# Patient Record
Sex: MALE | Race: WHITE | NOT HISPANIC OR LATINO | Employment: FULL TIME | URBAN - METROPOLITAN AREA
[De-identification: names, ages, dates, MRNs, and addresses within clinical notes are randomized per-mention and may not be internally consistent; named-entity substitution may affect disease eponyms.]

---

## 2017-02-20 ENCOUNTER — ALLSCRIPTS OFFICE VISIT (OUTPATIENT)
Dept: OTHER | Facility: OTHER | Age: 57
End: 2017-02-20

## 2017-02-20 DIAGNOSIS — Q61.9 CYSTIC KIDNEY DISEASE: ICD-10-CM

## 2017-02-21 ENCOUNTER — GENERIC CONVERSION - ENCOUNTER (OUTPATIENT)
Dept: OTHER | Facility: OTHER | Age: 57
End: 2017-02-21

## 2017-02-27 ENCOUNTER — HOSPITAL ENCOUNTER (OUTPATIENT)
Dept: ULTRASOUND IMAGING | Facility: MEDICAL CENTER | Age: 57
Discharge: HOME | End: 2017-02-27
Payer: COMMERCIAL

## 2017-02-27 DIAGNOSIS — Q61.9 CYSTIC KIDNEY DISEASE: ICD-10-CM

## 2017-02-27 PROCEDURE — 76770 US EXAM ABDO BACK WALL COMP: CPT

## 2017-02-28 ENCOUNTER — GENERIC CONVERSION - ENCOUNTER (OUTPATIENT)
Dept: OTHER | Facility: OTHER | Age: 57
End: 2017-02-28

## 2017-03-02 ENCOUNTER — GENERIC CONVERSION - ENCOUNTER (OUTPATIENT)
Dept: OTHER | Facility: OTHER | Age: 57
End: 2017-03-02

## 2017-03-02 ENCOUNTER — LAB CONVERSION - ENCOUNTER (OUTPATIENT)
Dept: OTHER | Facility: OTHER | Age: 57
End: 2017-03-02

## 2017-03-02 LAB
A/G RATIO (HISTORICAL): 1.6 (CALC) (ref 1–2.5)
ALBUMIN SERPL BCP-MCNC: 3.9 G/DL (ref 3.6–5.1)
ALP SERPL-CCNC: 79 U/L (ref 40–115)
ALT SERPL W P-5'-P-CCNC: 15 U/L (ref 9–46)
AST SERPL W P-5'-P-CCNC: 15 U/L (ref 10–35)
BASOPHILS # BLD AUTO: 0.5 %
BASOPHILS # BLD AUTO: 32 CELLS/UL (ref 0–200)
BILIRUB SERPL-MCNC: 0.4 MG/DL (ref 0.2–1.2)
BUN SERPL-MCNC: 18 MG/DL (ref 7–25)
BUN/CREA RATIO (HISTORICAL): ABNORMAL (CALC) (ref 6–22)
CALCIUM (ADJUSTED FOR ALBUMIN) (HISTORICAL): 9.5 MG/DL (CALC) (ref 8.6–10.2)
CALCIUM SERPL-MCNC: 9.1 MG/DL (ref 8.6–10.3)
CHLORIDE SERPL-SCNC: 106 MMOL/L (ref 98–110)
CHOLEST SERPL-MCNC: 149 MG/DL (ref 125–200)
CHOLEST/HDLC SERPL: 2.8 (CALC)
CO2 SERPL-SCNC: 26 MMOL/L (ref 20–31)
CREAT SERPL-MCNC: 1.07 MG/DL (ref 0.7–1.33)
DEPRECATED RDW RBC AUTO: 13.3 % (ref 11–15)
EGFR AFRICAN AMERICAN (HISTORICAL): 90 ML/MIN/1.73M2
EGFR-AMERICAN CALC (HISTORICAL): 78 ML/MIN/1.73M2
EOSINOPHIL # BLD AUTO: 1.3 %
EOSINOPHIL # BLD AUTO: 82 CELLS/UL (ref 15–500)
EST. AVERAGE GLUCOSE BLD GHB EST-MCNC: 123 (CALC)
EST. AVERAGE GLUCOSE BLD GHB EST-MCNC: 6.8 (CALC)
GAMMA GLOBULIN (HISTORICAL): 2.4 G/DL (CALC) (ref 1.9–3.7)
GLUCOSE (HISTORICAL): 100 MG/DL (ref 65–99)
HBA1C MFR BLD HPLC: 5.9 % OF TOTAL HGB
HCT VFR BLD AUTO: 42.2 % (ref 38.5–50)
HDLC SERPL-MCNC: 54 MG/DL
HGB BLD-MCNC: 14.2 G/DL (ref 13.2–17.1)
HIV AG/AB, 4TH GEN (HISTORICAL): NORMAL
LDL CHOLESTEROL (HISTORICAL): 87 MG/DL (CALC)
LYMPHOCYTES # BLD AUTO: 2249 CELLS/UL (ref 850–3900)
LYMPHOCYTES # BLD AUTO: 35.7 %
MCH RBC QN AUTO: 29.3 PG (ref 27–33)
MCHC RBC AUTO-ENTMCNC: 33.6 G/DL (ref 32–36)
MCV RBC AUTO: 87.1 FL (ref 80–100)
MONOCYTES # BLD AUTO: 372 CELLS/UL (ref 200–950)
MONOCYTES (HISTORICAL): 5.9 %
NEUTROPHILS # BLD AUTO: 3566 CELLS/UL (ref 1500–7800)
NEUTROPHILS # BLD AUTO: 56.6 %
NON-HDL-CHOL (CHOL-HDL) (HISTORICAL): 95 MG/DL (CALC)
PLATELET # BLD AUTO: 263 THOUSAND/UL (ref 140–400)
PMV BLD AUTO: 8.5 FL (ref 7.5–12.5)
POTASSIUM SERPL-SCNC: 4.6 MMOL/L (ref 3.5–5.3)
RBC # BLD AUTO: 4.84 MILLION/UL (ref 4.2–5.8)
SODIUM SERPL-SCNC: 139 MMOL/L (ref 135–146)
TOTAL PROTEIN (HISTORICAL): 6.3 G/DL (ref 6.1–8.1)
TRIGL SERPL-MCNC: 38 MG/DL
TSH SERPL DL<=0.05 MIU/L-ACNC: 2.22 MIU/L (ref 0.4–4.5)
WBC # BLD AUTO: 6.3 THOUSAND/UL (ref 3.8–10.8)

## 2017-03-03 ENCOUNTER — ALLSCRIPTS OFFICE VISIT (OUTPATIENT)
Dept: OTHER | Facility: OTHER | Age: 57
End: 2017-03-03

## 2017-03-10 ENCOUNTER — ALLSCRIPTS OFFICE VISIT (OUTPATIENT)
Dept: OTHER | Facility: OTHER | Age: 57
End: 2017-03-10

## 2017-03-17 ENCOUNTER — GENERIC CONVERSION - ENCOUNTER (OUTPATIENT)
Dept: OTHER | Facility: OTHER | Age: 57
End: 2017-03-17

## 2017-05-01 ENCOUNTER — ALLSCRIPTS OFFICE VISIT (OUTPATIENT)
Dept: OTHER | Facility: OTHER | Age: 57
End: 2017-05-01

## 2017-05-01 DIAGNOSIS — Q61.9 CYSTIC KIDNEY DISEASE: ICD-10-CM

## 2017-05-01 DIAGNOSIS — I10 ESSENTIAL (PRIMARY) HYPERTENSION: ICD-10-CM

## 2017-05-12 ENCOUNTER — GENERIC CONVERSION - ENCOUNTER (OUTPATIENT)
Dept: OTHER | Facility: OTHER | Age: 57
End: 2017-05-12

## 2017-05-20 ENCOUNTER — OFFICE VISIT (OUTPATIENT)
Dept: URGENT CARE | Facility: MEDICAL CENTER | Age: 57
End: 2017-05-20
Payer: COMMERCIAL

## 2017-05-20 PROCEDURE — G0382 LEV 3 HOSP TYPE B ED VISIT: HCPCS

## 2017-05-20 PROCEDURE — S9083 URGENT CARE CENTER GLOBAL: HCPCS

## 2017-05-20 PROCEDURE — 90715 TDAP VACCINE 7 YRS/> IM: CPT

## 2017-05-20 PROCEDURE — 12002 RPR S/N/AX/GEN/TRNK2.6-7.5CM: CPT

## 2017-05-22 ENCOUNTER — OFFICE VISIT (OUTPATIENT)
Dept: URGENT CARE | Facility: MEDICAL CENTER | Age: 57
End: 2017-05-22
Payer: COMMERCIAL

## 2017-05-22 PROCEDURE — 99213 OFFICE O/P EST LOW 20 MIN: CPT

## 2017-05-23 ENCOUNTER — LAB CONVERSION - ENCOUNTER (OUTPATIENT)
Dept: OTHER | Facility: OTHER | Age: 57
End: 2017-05-23

## 2017-05-23 LAB
BACTERIA UR QL AUTO: ABNORMAL /HPF
BILIRUB UR QL STRIP: NEGATIVE
BUN SERPL-MCNC: 19 MG/DL (ref 7–25)
BUN/CREA RATIO (HISTORICAL): NORMAL (CALC) (ref 6–22)
CALCIUM SERPL-MCNC: 9.2 MG/DL (ref 8.6–10.3)
CHLORIDE SERPL-SCNC: 106 MMOL/L (ref 98–110)
CO2 SERPL-SCNC: 27 MMOL/L (ref 20–31)
COLOR UR: YELLOW
COMMENT (HISTORICAL): CLEAR
COMMENTS (HISTORICAL): ABNORMAL
CREAT SERPL-MCNC: 1.01 MG/DL (ref 0.7–1.33)
CREATININE, RANDOM URINE (HISTORICAL): 69 MG/DL (ref 20–370)
EGFR AFRICAN AMERICAN (HISTORICAL): 97 ML/MIN/1.73M2
EGFR-AMERICAN CALC (HISTORICAL): 83 ML/MIN/1.73M2
FECAL OCCULT BLOOD DIAGNOSTIC (HISTORICAL): ABNORMAL
GLUCOSE (HISTORICAL): 98 MG/DL (ref 65–99)
GLUCOSE (HISTORICAL): NEGATIVE
HYALINE CASTS #/AREA URNS LPF: ABNORMAL /LPF
KETONES UR STRIP-MCNC: NEGATIVE MG/DL
LEUKOCYTE ESTERASE UR QL STRIP: NEGATIVE
NITRITE UR QL STRIP: NEGATIVE
PH UR STRIP.AUTO: 5.5 [PH] (ref 5–8)
POTASSIUM SERPL-SCNC: 4.8 MMOL/L (ref 3.5–5.3)
PROT UR STRIP-MCNC: NEGATIVE MG/DL
PROT UR-MCNC: 38 MG/DL (ref 5–25)
PROT/CREAT UR: 551 MG/G CREAT (ref 22–128)
RBC (HISTORICAL): ABNORMAL /HPF
SODIUM SERPL-SCNC: 139 MMOL/L (ref 135–146)
SP GR UR STRIP.AUTO: 1.01 (ref 1–1.03)
SQUAMOUS EPITHELIAL CELLS (HISTORICAL): ABNORMAL /HPF
WBC # BLD AUTO: ABNORMAL /HPF

## 2017-06-10 ENCOUNTER — OFFICE VISIT (OUTPATIENT)
Dept: URGENT CARE | Facility: MEDICAL CENTER | Age: 57
End: 2017-06-10
Payer: COMMERCIAL

## 2017-06-10 PROCEDURE — G0383 LEV 4 HOSP TYPE B ED VISIT: HCPCS

## 2017-06-10 PROCEDURE — S9083 URGENT CARE CENTER GLOBAL: HCPCS

## 2017-07-03 DIAGNOSIS — I10 ESSENTIAL (PRIMARY) HYPERTENSION: ICD-10-CM

## 2017-07-03 DIAGNOSIS — Q61.2 ADULT TYPE POLYCYSTIC KIDNEY: ICD-10-CM

## 2017-08-15 ENCOUNTER — ALLSCRIPTS OFFICE VISIT (OUTPATIENT)
Dept: OTHER | Facility: OTHER | Age: 57
End: 2017-08-15

## 2017-08-15 DIAGNOSIS — N45.1 EPIDIDYMITIS: ICD-10-CM

## 2017-08-15 DIAGNOSIS — S20.212A CONTUSION OF LEFT FRONT WALL OF THORAX: ICD-10-CM

## 2017-08-15 DIAGNOSIS — E87.5 HYPERKALEMIA: ICD-10-CM

## 2017-08-15 DIAGNOSIS — N50.89 OTHER SPECIFIED DISORDERS OF THE MALE GENITAL ORGANS: ICD-10-CM

## 2017-08-15 LAB
BILIRUB UR QL STRIP: NEGATIVE
CLARITY UR: NORMAL
COLOR UR: YELLOW
GLUCOSE (HISTORICAL): NEGATIVE
HGB UR QL STRIP.AUTO: NORMAL
KETONES UR STRIP-MCNC: NEGATIVE MG/DL
LEUKOCYTE ESTERASE UR QL STRIP: NORMAL
NITRITE UR QL STRIP: NEGATIVE
PH UR STRIP.AUTO: 5.5 [PH]
PROT UR STRIP-MCNC: NORMAL MG/DL
SP GR UR STRIP.AUTO: 1.02
UROBILINOGEN UR QL STRIP.AUTO: 0.2

## 2017-08-16 ENCOUNTER — APPOINTMENT (OUTPATIENT)
Dept: LAB | Facility: HOSPITAL | Age: 57
End: 2017-08-16
Payer: COMMERCIAL

## 2017-08-16 DIAGNOSIS — N45.1 EPIDIDYMITIS: ICD-10-CM

## 2017-08-16 PROCEDURE — 87086 URINE CULTURE/COLONY COUNT: CPT

## 2017-08-17 ENCOUNTER — GENERIC CONVERSION - ENCOUNTER (OUTPATIENT)
Dept: OTHER | Facility: OTHER | Age: 57
End: 2017-08-17

## 2017-08-17 LAB — BACTERIA UR CULT: NORMAL

## 2017-08-26 ENCOUNTER — APPOINTMENT (OUTPATIENT)
Dept: RADIOLOGY | Facility: MEDICAL CENTER | Age: 57
End: 2017-08-26
Payer: COMMERCIAL

## 2017-08-26 ENCOUNTER — OFFICE VISIT (OUTPATIENT)
Dept: URGENT CARE | Facility: MEDICAL CENTER | Age: 57
End: 2017-08-26
Payer: COMMERCIAL

## 2017-08-26 DIAGNOSIS — S20.212A CONTUSION OF LEFT FRONT WALL OF THORAX: ICD-10-CM

## 2017-08-26 PROCEDURE — S9083 URGENT CARE CENTER GLOBAL: HCPCS

## 2017-08-26 PROCEDURE — 71101 X-RAY EXAM UNILAT RIBS/CHEST: CPT

## 2017-08-26 PROCEDURE — G0382 LEV 3 HOSP TYPE B ED VISIT: HCPCS

## 2017-09-11 ENCOUNTER — LAB REQUISITION (OUTPATIENT)
Dept: LAB | Facility: HOSPITAL | Age: 57
End: 2017-09-11
Payer: COMMERCIAL

## 2017-09-11 ENCOUNTER — ALLSCRIPTS OFFICE VISIT (OUTPATIENT)
Dept: OTHER | Facility: OTHER | Age: 57
End: 2017-09-11

## 2017-09-11 DIAGNOSIS — Z13.1 ENCOUNTER FOR SCREENING FOR DIABETES MELLITUS: ICD-10-CM

## 2017-09-11 DIAGNOSIS — Z13.0 ENCOUNTER FOR SCREENING FOR DISEASES OF THE BLOOD AND BLOOD-FORMING ORGANS AND CERTAIN DISORDERS INVOLVING THE IMMUNE MECHANISM: ICD-10-CM

## 2017-09-11 DIAGNOSIS — Z72.51 HIGH RISK HETEROSEXUAL BEHAVIOR: ICD-10-CM

## 2017-09-11 DIAGNOSIS — N45.1 EPIDIDYMITIS: ICD-10-CM

## 2017-09-11 DIAGNOSIS — F41.1 GENERALIZED ANXIETY DISORDER: ICD-10-CM

## 2017-09-11 DIAGNOSIS — I10 ESSENTIAL (PRIMARY) HYPERTENSION: ICD-10-CM

## 2017-09-11 DIAGNOSIS — Q61.3 POLYCYSTIC KIDNEY: ICD-10-CM

## 2017-09-11 DIAGNOSIS — Q61.2 ADULT TYPE POLYCYSTIC KIDNEY: ICD-10-CM

## 2017-09-11 LAB
ALBUMIN SERPL BCP-MCNC: 4.1 G/DL (ref 3.5–5)
ALP SERPL-CCNC: 110 U/L (ref 46–116)
ALT SERPL W P-5'-P-CCNC: 21 U/L (ref 12–78)
ANION GAP SERPL CALCULATED.3IONS-SCNC: 5 MMOL/L (ref 4–13)
AST SERPL W P-5'-P-CCNC: 17 U/L (ref 5–45)
BILIRUB SERPL-MCNC: 0.5 MG/DL (ref 0.2–1)
BILIRUB UR QL STRIP: NEGATIVE
BUN SERPL-MCNC: 21 MG/DL (ref 5–25)
CALCIUM SERPL-MCNC: 9.6 MG/DL (ref 8.3–10.1)
CHLORIDE SERPL-SCNC: 101 MMOL/L (ref 100–108)
CHOLEST SERPL-MCNC: 166 MG/DL (ref 50–200)
CLARITY UR: NORMAL
CO2 SERPL-SCNC: 28 MMOL/L (ref 21–32)
COLOR UR: YELLOW
CREAT SERPL-MCNC: 0.93 MG/DL (ref 0.6–1.3)
EST. AVERAGE GLUCOSE BLD GHB EST-MCNC: 108 MG/DL
GFR SERPL CREATININE-BSD FRML MDRD: 92 ML/MIN/1.73SQ M
GLUCOSE (HISTORICAL): NEGATIVE
GLUCOSE P FAST SERPL-MCNC: 102 MG/DL (ref 65–99)
HBA1C MFR BLD: 5.4 % (ref 4.2–6.3)
HDLC SERPL-MCNC: 70 MG/DL (ref 40–60)
HGB UR QL STRIP.AUTO: NORMAL
KETONES UR STRIP-MCNC: NEGATIVE MG/DL
LDLC SERPL CALC-MCNC: 85 MG/DL (ref 0–100)
LEUKOCYTE ESTERASE UR QL STRIP: NEGATIVE
NITRITE UR QL STRIP: NEGATIVE
PH UR STRIP.AUTO: 7.5 [PH]
POTASSIUM SERPL-SCNC: 5.6 MMOL/L (ref 3.5–5.3)
PROT SERPL-MCNC: 7.3 G/DL (ref 6.4–8.2)
PROT UR STRIP-MCNC: NEGATIVE MG/DL
SODIUM SERPL-SCNC: 134 MMOL/L (ref 136–145)
SP GR UR STRIP.AUTO: 10.2
TRIGL SERPL-MCNC: 55 MG/DL
UROBILINOGEN UR QL STRIP.AUTO: 0.2

## 2017-09-11 PROCEDURE — 80053 COMPREHEN METABOLIC PANEL: CPT | Performed by: FAMILY MEDICINE

## 2017-09-11 PROCEDURE — 83036 HEMOGLOBIN GLYCOSYLATED A1C: CPT | Performed by: FAMILY MEDICINE

## 2017-09-11 PROCEDURE — 87389 HIV-1 AG W/HIV-1&-2 AB AG IA: CPT | Performed by: FAMILY MEDICINE

## 2017-09-11 PROCEDURE — 80061 LIPID PANEL: CPT | Performed by: FAMILY MEDICINE

## 2017-09-13 LAB — HIV 1+2 AB+HIV1 P24 AG SERPL QL IA: NORMAL

## 2017-09-14 ENCOUNTER — GENERIC CONVERSION - ENCOUNTER (OUTPATIENT)
Dept: OTHER | Facility: OTHER | Age: 57
End: 2017-09-14

## 2017-09-18 ENCOUNTER — HOSPITAL ENCOUNTER (OUTPATIENT)
Dept: ULTRASOUND IMAGING | Facility: MEDICAL CENTER | Age: 57
Discharge: HOME | End: 2017-09-18
Payer: COMMERCIAL

## 2017-09-18 DIAGNOSIS — N45.1 EPIDIDYMITIS: ICD-10-CM

## 2017-09-18 DIAGNOSIS — N50.89 OTHER SPECIFIED DISORDERS OF THE MALE GENITAL ORGANS: ICD-10-CM

## 2017-09-18 PROCEDURE — 76870 US EXAM SCROTUM: CPT

## 2017-09-19 ENCOUNTER — APPOINTMENT (OUTPATIENT)
Dept: LAB | Facility: HOSPITAL | Age: 57
End: 2017-09-19
Payer: COMMERCIAL

## 2017-09-19 DIAGNOSIS — E87.5 HYPERKALEMIA: ICD-10-CM

## 2017-09-19 LAB
ANION GAP SERPL CALCULATED.3IONS-SCNC: 5 MMOL/L (ref 4–13)
BUN SERPL-MCNC: 20 MG/DL (ref 5–25)
CALCIUM SERPL-MCNC: 9.2 MG/DL (ref 8.3–10.1)
CHLORIDE SERPL-SCNC: 108 MMOL/L (ref 100–108)
CO2 SERPL-SCNC: 28 MMOL/L (ref 21–32)
CREAT SERPL-MCNC: 0.94 MG/DL (ref 0.6–1.3)
GFR SERPL CREATININE-BSD FRML MDRD: 91 ML/MIN/1.73SQ M
GLUCOSE SERPL-MCNC: 87 MG/DL (ref 65–140)
POTASSIUM SERPL-SCNC: 5.8 MMOL/L (ref 3.5–5.3)
SODIUM SERPL-SCNC: 141 MMOL/L (ref 136–145)

## 2017-09-19 PROCEDURE — 80048 BASIC METABOLIC PNL TOTAL CA: CPT

## 2017-09-19 PROCEDURE — 36415 COLL VENOUS BLD VENIPUNCTURE: CPT

## 2017-10-16 ENCOUNTER — HOSPITAL ENCOUNTER (OUTPATIENT)
Dept: ULTRASOUND IMAGING | Facility: MEDICAL CENTER | Age: 57
Discharge: HOME | End: 2017-10-16
Payer: COMMERCIAL

## 2017-10-16 DIAGNOSIS — Q61.9 CYSTIC KIDNEY DISEASE: ICD-10-CM

## 2017-10-16 PROCEDURE — 76700 US EXAM ABDOM COMPLETE: CPT

## 2017-10-23 ENCOUNTER — GENERIC CONVERSION - ENCOUNTER (OUTPATIENT)
Dept: OTHER | Facility: OTHER | Age: 57
End: 2017-10-23

## 2017-10-23 ENCOUNTER — GENERIC CONVERSION - ENCOUNTER (OUTPATIENT)
Dept: NEPHROLOGY | Facility: CLINIC | Age: 57
End: 2017-10-23

## 2017-10-23 DIAGNOSIS — Q61.2 ADULT TYPE POLYCYSTIC KIDNEY: ICD-10-CM

## 2017-10-23 DIAGNOSIS — Z12.5 ENCOUNTER FOR SCREENING FOR MALIGNANT NEOPLASM OF PROSTATE: ICD-10-CM

## 2017-10-23 DIAGNOSIS — E87.5 HYPERKALEMIA: ICD-10-CM

## 2017-10-25 ENCOUNTER — TRANSCRIBE ORDERS (OUTPATIENT)
Dept: ADMINISTRATIVE | Facility: HOSPITAL | Age: 57
End: 2017-10-25

## 2017-10-25 ENCOUNTER — APPOINTMENT (OUTPATIENT)
Dept: LAB | Facility: MEDICAL CENTER | Age: 57
End: 2017-10-25
Payer: COMMERCIAL

## 2017-10-25 DIAGNOSIS — E87.5 HYPERKALEMIA: ICD-10-CM

## 2017-10-25 LAB
ANION GAP SERPL CALCULATED.3IONS-SCNC: 7 MMOL/L (ref 4–13)
BACTERIA UR QL AUTO: ABNORMAL /HPF
BILIRUB UR QL STRIP: NEGATIVE
BUN SERPL-MCNC: 16 MG/DL (ref 5–25)
CALCIUM SERPL-MCNC: 9 MG/DL (ref 8.3–10.1)
CHLORIDE SERPL-SCNC: 105 MMOL/L (ref 100–108)
CHLORIDE UR-SCNC: 146 MMOL/L (ref 10–330)
CLARITY UR: CLEAR
CO2 SERPL-SCNC: 28 MMOL/L (ref 21–32)
COLOR UR: YELLOW
CREAT SERPL-MCNC: 0.97 MG/DL (ref 0.6–1.3)
CREAT UR-MCNC: 76.4 MG/DL
CREAT UR-MCNC: 76.4 MG/DL
GFR SERPL CREATININE-BSD FRML MDRD: 87 ML/MIN/1.73SQ M
GLUCOSE P FAST SERPL-MCNC: 83 MG/DL (ref 65–99)
GLUCOSE UR STRIP-MCNC: NEGATIVE MG/DL
HGB UR QL STRIP.AUTO: ABNORMAL
HYALINE CASTS #/AREA URNS LPF: ABNORMAL /LPF
KETONES UR STRIP-MCNC: NEGATIVE MG/DL
LEUKOCYTE ESTERASE UR QL STRIP: NEGATIVE
NITRITE UR QL STRIP: NEGATIVE
NON-SQ EPI CELLS URNS QL MICRO: ABNORMAL /HPF
OSMOLALITY UR/SERPL-RTO: 294 MMOL/KG (ref 282–298)
OSMOLALITY UR: 480 MMOL/KG
PH UR STRIP.AUTO: 6.5 [PH] (ref 4.5–8)
POTASSIUM SERPL-SCNC: 4.8 MMOL/L (ref 3.5–5.3)
POTASSIUM UR-SCNC: 50.2 MMOL/L (ref 1–300)
PROT UR STRIP-MCNC: NEGATIVE MG/DL
PROT UR-MCNC: 6 MG/DL
PROT/CREAT UR: 0.08 MG/G{CREAT} (ref 0–0.1)
RBC #/AREA URNS AUTO: ABNORMAL /HPF
SODIUM 24H UR-SCNC: 108 MOL/L
SODIUM SERPL-SCNC: 140 MMOL/L (ref 136–145)
SP GR UR STRIP.AUTO: 1.01 (ref 1–1.03)
UROBILINOGEN UR QL STRIP.AUTO: 0.2 E.U./DL
WBC #/AREA URNS AUTO: ABNORMAL /HPF

## 2017-10-25 PROCEDURE — 83930 ASSAY OF BLOOD OSMOLALITY: CPT

## 2017-10-25 PROCEDURE — 82088 ASSAY OF ALDOSTERONE: CPT

## 2017-10-25 PROCEDURE — 36415 COLL VENOUS BLD VENIPUNCTURE: CPT

## 2017-10-25 PROCEDURE — 84133 ASSAY OF URINE POTASSIUM: CPT

## 2017-10-25 PROCEDURE — 82570 ASSAY OF URINE CREATININE: CPT

## 2017-10-25 PROCEDURE — 83935 ASSAY OF URINE OSMOLALITY: CPT

## 2017-10-25 PROCEDURE — 80048 BASIC METABOLIC PNL TOTAL CA: CPT

## 2017-10-25 PROCEDURE — 81001 URINALYSIS AUTO W/SCOPE: CPT

## 2017-10-25 PROCEDURE — 84156 ASSAY OF PROTEIN URINE: CPT

## 2017-10-25 PROCEDURE — 84244 ASSAY OF RENIN: CPT

## 2017-10-25 PROCEDURE — 84300 ASSAY OF URINE SODIUM: CPT

## 2017-10-25 PROCEDURE — 82436 ASSAY OF URINE CHLORIDE: CPT

## 2017-10-30 LAB — RENIN PLAS-CCNC: 2.79 NG/ML/HR (ref 0.17–5.38)

## 2017-11-01 LAB — ALDOST SERPL-MCNC: <1 NG/DL (ref 0–30)

## 2017-11-02 ENCOUNTER — ALLSCRIPTS OFFICE VISIT (OUTPATIENT)
Dept: OTHER | Facility: OTHER | Age: 57
End: 2017-11-02

## 2017-11-02 LAB
BILIRUB UR QL STRIP: NORMAL
CLARITY UR: NORMAL
COLOR UR: YELLOW
GLUCOSE (HISTORICAL): NORMAL
HGB UR QL STRIP.AUTO: NORMAL
KETONES UR STRIP-MCNC: NORMAL MG/DL
LEUKOCYTE ESTERASE UR QL STRIP: NORMAL
NITRITE UR QL STRIP: NORMAL
PH UR STRIP.AUTO: 6 [PH]
PROT UR STRIP-MCNC: NORMAL MG/DL
SP GR UR STRIP.AUTO: 1.01

## 2017-11-03 NOTE — CONSULTS
Assessment    1  Microhematuria (599 72) (R31 29)  2  Groin pain (789 09) (R10 30)  3  Bilateral renal cysts (753 10) (N28 1)  4  Erectile dysfunction (607 84) (N52 9)  5  ADPKD (autosomal dominant polycystic kidney) (753 13) (Q61 2)  6  Special screening examination for neoplasm of prostate (V76 44) (Z12 5)    Plan  Erectile dysfunction    · Start: Viagra 100 MG Oral Tablet; TAKE 1 TABLET BY MOUTH 1 HOUR PRIOR TOINTERCOURSE  Rx By: Maricruz Garcia; Dispense: 6 Days ; #:6 Tablet; Refill: 11; For: Erectile dysfunction; DAVID = N; Print Rx   · Urine Dip Non-Automated- POC; Status:Complete;   Done: 64YRC9210 09:22AM  Performed: In Office; YSJ:53TXK8942; Ordered; For:Erectile dysfunction; Ordered By:Naheed Treadwell;  Microhematuria    · Cystourethroscopy - POC; Status:Active - Perform Order; Requested NWM:76YLZ7528;   Perform: In Office; RCL:54YGH6352; Ordered; For:Microhematuria; Ordered By:Naheed Treadwell;  Special screening examination for neoplasm of prostate    · (1) PSA (SCREEN) (Dx V76 44 Screen for Prostate Cancer); Status:Active; Requestedfor:02Nov2017;   Perform:[facility]; SZF:87SKX0457; Ordered; For:Special screening examination for neoplasm of prostate; Ordered By:Naheed Treadwell; Discussion/Summary  Discussion Summary:   My impression is microscopic hematuria likely secondary to polycystic kidney disease, bilateral Bosniak 2 cysts, routine prostate cancer screening, erectile 1  dysfunction  prescription for Viagra along with a discount coupon was provided  With regards to his microscopic hematuria, I recommend performing flexible cystoscopy  I am hesitant to recommend a CT scan of the abdomen and pelvis with IV contrast secondary to his history of polycystic kidney disease  I will attempt to avoid any nephrotoxic meds including intravenous contrast  Continue follow-up with Nephrology recommended   With regards to his prior episode of epididymitis, there is no sign of residual disease, tenderness, or swelling of the left hemiscrotum at this time  There are no further interventions recommended for his history of epididymitis  Follow-up for cystoscopy recommended  Self Referrals:   Self Referrals: No DR Frank       1 Amended By: Dave Elmore; Nov 08 2017 1:31 PM EST    Chief Complaint  Chief Complaint Free Text Note Form: New patient consult for microhematuria, groin pain,bilateral renal cysts, and erectile dysfunction  History of Present Illness  HPI: Marshall Alicea is a 60-year-old male referred for microscopic hematuria  A recent urinalysis reveals 10-20 red blood cells per high-powered field  The patient has been evaluated by Nephrology  Recent renin and aldosterone levels are normal  A recent retroperitoneal ultrasound reveals a Bosniak 2 cyst identified in both kidneys  This is reported as âstableâ from a prior ultrasound in early 2017  In August 2017 he complained of a tender and swollen left testicle  He was diagnosed and treated with antibiotics for epididymitis  The patient also had a ultrasound of his scrotum recently performed and is normal   patient states that he has polycystic kidney disease  His father has autosomal dominant polycystic kidney disease and is currently on dialysis  He also complains of erectile dysfunction  He also has difficulty obtaining as well as maintaining erections  He denies any history of STDs  He is routine HIV testing  Review of Systems  Complete-Male Urology:  Constitutional: No fever or chills, feels well, no tiredness, no recent weight gain or weight loss  Respiratory: No complaints of shortness of breath, no wheezing, no cough, no SOB on exertion, no orthopnea or PND  Cardiovascular: No complaints of slow heart rate, no fast heart rate, no chest pain, no palpitations, no leg claudication, no lower extremity  Gastrointestinal: No complaints of abdominal pain, no constipation, no nausea or vomiting, no diarrhea or bloody stools    Genitourinary: Empty sensation,-- feelings of urinary urgency-- (occasional)-- and-- stream quality poor, but-- as noted in HPI,-- no dysuria,-- no urinary hesitancy,-- no hematuria-- and-- no incontinence--   The patient presents with complaints of nocturia (occasional)  Musculoskeletal: No complaints of arthralgia, no myalgias, no joint swelling or stiffness, no limb pain or swelling  Integumentary: No complaints of skin rash or skin lesions, no itching, no skin wound, no dry skin  Hematologic/Lymphatic: No complaints of swollen glands, no swollen glands in the neck, does not bleed easily, no easy bruising  Neurological: No compliants of headache, no confusion, no convulsions, no numbness or tingling, no dizziness or fainting, no limb weakness, no difficulty walking  ROS Reviewed:   ROS reviewed  Active Problems  1  Acute epididymitis (604 99) (N45 1)  2  ADPKD (autosomal dominant polycystic kidney) (753 13) (Q61 2)  3  Benign essential hypertension (401 1) (I10)  4  Benign prostatic hypertrophy (600 00) (N40 0)  5  Bilateral renal cysts (753 10) (N28 1)  6  Chronic kidney disease, stage 2 (mild) (585 2) (N18 2)  7  Chronic migraine without aura (346 70) (G43 709)  8  Colon cancer screening (V76 51) (Z12 11)  9  Contusion of rib on left side, initial encounter (922 1) (S20 212A)  10  Cystic kidney disease (753 10) (Q61 9)  11  Erectile dysfunction (607 84) (N52 9)  12  Fullness of scrotum (608 89) (N50 89)  13  Generalized anxiety disorder (300 02) (F41 1)  14  Groin pain (789 09) (R10 30)  15  History of High risk sexual behavior (V69 2) (Z72 51)  16  History of nephrolithiasis (V13 01) (Z87 442)  17  Hyperkalemia (276 7) (E87 5)  18  Laceration of right palm, initial encounter (882 0) (S61 411A)  19  Microhematuria (599 72) (R31 29)  20  Need for influenza vaccination (V04 81) (Z23)  21  Polycystic kidney disease (753 12) (Q61 3)  22  Postoperative wound dehiscence, initial encounter (488 32) (T81 31XA)  23   Proteinuria (791 0) (R80 9)  24  Rib injury  25  Screening for diabetes mellitus (DM) (V77 1) (Z13 1)  26  Screening for iron deficiency anemia (V78 0) (Z13 0)  27  Skin infection (686 9) (L08 9)    Past Medical History  1  History of Benign essential hypertension (401 1) (I10)  2  History of folliculitis (R90 4) (S71 4)  3  History of migraine (V12 49) (Z86 69)  4  History of Need for influenza vaccination (V04 81) (Z23)  5  History of Need for influenza vaccination (V04 81) (Z23)  6  History of Open Wound Of The Face (873 40)  7  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) (K13 70)  8  History of Preventative health care (V70 0) (Z00 00)  9  History of Screening for cholesterol level (V77 91) (Z13 220)  10  History of Screening for diabetes mellitus (V77 1) (Z13 1)  11  History of Screening for thyroid disorder (V77 0) (Z13 29)  12  History of Screening for tuberculosis (V74 1) (Z11 1)  Active Problems And Past Medical History Reviewed: The active problems and past medical history were reviewed and updated today  Surgical History  1  History of Appendectomy  Surgical History Reviewed: The surgical history was reviewed and updated today  Family History  Father   1  Family history of ADPKD (autosomal dominant polycystic kidney disease)  2  Family history of hypertension (V17 49) (Z82 49)  Family History Reviewed: The family history was reviewed and updated today  Social History     · Being A Social Drinker   · History of Drug Use (305 90)   · Former smoker (V15 82) (W99 193)   · History of High risk sexual behavior (V69 2) (Z72 51)   · Never a smoker   · Occasional alcohol use  Social History Reviewed: The social history was reviewed and updated today  The social history was reviewed and is unchanged  Current Meds  1  BuPROPion HCl ER (XL) 300 MG Oral Tablet Extended Release 24 Hour; Take 1 tablet daily; Therapy: 35Lcq5527 to (Last Rx:84Omj1763)  Requested for: 51Tzl0505 Ordered  2   HydrOXYzine HCl - 25 MG Oral Tablet; TAKE 1 TABLET 3 TIMES DAILY AS NEEDED; Therapy: 14VRQ1623 to (77 873 135)  Requested for: 79FGV0456; Last Rx:18Oct2017 Ordered  3  Lidocaine 5 % External Ointment; APPLY 2 INCH Every twelve hours PRN pain; Therapy: 57Ukg0732 to (Last Rx:37Bno8567)  Requested for: 45Xjf6726 Ordered  4  Olmesartan Medoxomil 20 MG Oral Tablet; TAKE 1 TABLET DAILY; Therapy: 78HLX2998 to (KOVSTRVI:92PAT4037)  Requested for: 23Oct2017; Last CZ:65GHW3733 Ordered  5  Sertraline HCl - 50 MG Oral Tablet; Take 1 tablet daily; Therapy: 83CJL2583 to [phone])  Requested for: [phone]; Last CRUZ:22XDA9225 Ordered  Medication List Reviewed: The medication list was reviewed and updated today  Allergies  1  No Known Drug Allergies    Vitals  Vital Signs    Recorded: 84LUE0073 09:20AM   Heart Rate 82   Systolic 467   Diastolic 80   Height 5 ft 7 in   Weight 161 lb    BMI Calculated 25 22   BSA Calculated 1 84       Physical Exam   Additional Exam:  On examination he is in no acute distress  His abdomen is soft nontender nondistended   examination reveals no CVA tenderness  Skin is warm  Extremities without edema  Neurologic is grossly intact and nonfocal  Phallus, scrotum and scrotal contents are normal  There is no sign of epididymitis  Digital rectal examination reveals a 30 g prostate which is smooth and firm without nodularity  Skin is warm  Extremities without edema   Neurologic is grossly intact and nonfocal       Results/Data  AUA Symptom Score 91RHV6311 09:23AM User, s     Test Name Result Flag Reference   AUA Symptom Score (for prostate disease) 6       Incomplete emptying: Not at all (0) Frequency: Less than 1 time in 5 (1) Intermittency: Not at all (0) Urgency: Not at all (0) Weak-stream: Almost always (5) Straining: Not at all (0) Nocturia: Not at all (0)   AUA Symptom Score (for prostate disease) - Quality of Life Due to Urinary Symptoms Mixed     AUA Symptom Score (for prostate disease) - Score Category Mild       Urine Dip Non-Automated- POC 87JCH6773 09:22AM Bhumika Morton     Test Name Result Flag Reference   Color Yellow       Clarity Transparent     Leukocytes neg     Nitrite neg     Blood large     Bilirubin neg     Protein neg     Ph 6 0     Specific Gravity 1 010     Ketone neg     Glucose neg     Color Yellow       Clarity Transparent     Leukocytes neg     Nitrite neg     Blood large     Bilirubin neg     Protein neg     Ph 6 0     Specific Gravity 1 010     Ketone neg     Glucose neg             * US ABDOMEN COMPLETE 16Oct2017 11:04AM Lora Pedraza Order Number: [id number]  Performing Comments: r/o PCKD as patient w/multiple b/l renal cysts, ? liver cysts   - Patient Instructions: To schedule this appointment, please contact Central Scheduling at [phone]  Test Name Result Flag Reference   US ABDOMEN COMPLETE (Report)       ABDOMEN ULTRASOUND, COMPLETE   INDICATION: Cystic kidney disease  Evaluate for liver cysts  COMPARISON: Ultrasound of the kidneys and bladder 2/27/2017  TECHNIQUE:  Real-time ultrasound of the abdomen was performed with a curvilinear transducer with both volumetric sweeps and still imaging techniques  FINDINGS:   PANCREAS: Visualized portions of the pancreas are within normal limits  AORTA AND IVC: Visualized portions are normal for patient age  LIVER:  Size: Within normal range  The liver measures 16 0 cm in the midclavicular line  Contour: Surface contour is smooth  Parenchyma: Echogenicity and echotexture are within normal limits  No evidence of suspicious mass  The main portal vein is patent and hepatopetal     BILIARY:  The gallbladder is normal in caliber  No wall thickening or pericholecystic fluid  No stones or sludge identified  Sonographic Jaclyn Travis sign is negative  No intrahepatic biliary dilatation  CBD measures 5 mm in the sherita hepatis and tapers to 3 2 mm distally  No choledocholithiasis     KIDNEY:   Right kidney measures 10 2 x 5 3 cm  Lower pole cyst with thin septation is not significantly changed in size or appearance, measuring 4 8 x 5 1 x 4 0 cm  1 4 x 1 4 x 1 5 cm mid pole and 1 5 x 1 8 x 1 3 cm upper pole cysts are also stable  Left kidney measures 10 2 x 6 3 cm  Multiple cysts are again seen and are not significantly changed in size or appearance  For reference, left lower pole cyst measures 8 1 x 7 4 x 8 8 cm  Mildly complex cyst with thin septations in the midpole measures 4 3 x 3 9 x 2 4 cm and in the upper   pole measures 4 5 x 5 2 x 5 6 cm  SPLEEN:   Measures 9 8 cm  Within normal limits  ASCITES: None  IMPRESSION:    1  Bilateral renal cysts not significantly changed in size or appearance  This includes minimally complex cysts (Bosniak 2) within the right lower pole and left mid and upper poles  No suspicious masses identified  2  No liver cysts identified  Workstation performed: [id number]   Signed by:  William Jones MD  10/18/17     US SCROTUM AND TESTICLES 02Lpt7913 12:00PM Fabricio Lust      Order Number: [id number]   - Patient Instructions: To schedule this appointment, please contact Central Scheduling at [phone]  Test Name Result Flag Reference   US SCROTUM AND TESTICLES (Report)       SCROTAL ULTRASOUND   INDICATION: Left-sided tenderness and swelling  COMPARISON: None  TECHNIQUE:  Ultrasound the scrotal contents was performed with a high frequency linear transducer utilizing volumetric sweep imaging as well as standard still image techniques  Imaging performed in longitudinal and transverse orientation  Color and   spectral Doppler evaluation also performed bilaterally  FINDINGS:   TESTES:   Testes are symmetric and normal in size  RIGHT testis = 3 5 x 4 4 x 2 5 cm   Normal contour with homogeneous smooth echotexture  No intratesticular mass lesion or calcifications  LEFT testis = 3 0 x 4 4 x 2 3 cm  Normal contour with homogeneous smooth echotexture    No intratesticular mass lesion or calcifications  Doppler flow within both testes is present and symmetric  EPIDIDYMIDES:   Normal Size  Doppler ultrasound demonstrates normal blood flow  No epididymal lesions  HYDROCELE: No significant fluid present  VARICOCELE: None present  SCROTUM: Scrotal thickness and appearance within normal limits  No evidence for extratesticular mass or hernia demonstrated  IMPRESSION:    Normal      Workstation performed: [id number]   Signed by:   Alvino Suárez MD  9/20/17       Signatures   Electronically signed by : Renata Booker MD; Nov 2 2017 10:07AM EST                       (Author)    Electronically signed by : Renata Booker MD; Nov 8 2017  1:32PM EST                       (Author)

## 2017-11-06 ENCOUNTER — APPOINTMENT (OUTPATIENT)
Dept: LAB | Facility: MEDICAL CENTER | Age: 57
End: 2017-11-06
Payer: COMMERCIAL

## 2017-11-06 ENCOUNTER — TRANSCRIBE ORDERS (OUTPATIENT)
Dept: ADMINISTRATIVE | Facility: HOSPITAL | Age: 57
End: 2017-11-06

## 2017-11-06 DIAGNOSIS — Z12.5 ENCOUNTER FOR SCREENING FOR MALIGNANT NEOPLASM OF PROSTATE: ICD-10-CM

## 2017-11-06 LAB — PSA SERPL-MCNC: 3.2 NG/ML (ref 0–4)

## 2017-11-06 PROCEDURE — 36415 COLL VENOUS BLD VENIPUNCTURE: CPT

## 2017-11-06 PROCEDURE — G0103 PSA SCREENING: HCPCS

## 2017-11-20 ENCOUNTER — ALLSCRIPTS OFFICE VISIT (OUTPATIENT)
Dept: OTHER | Facility: OTHER | Age: 57
End: 2017-11-20

## 2017-11-20 LAB
BILIRUB UR QL STRIP: NORMAL
CLARITY UR: NORMAL
COLOR UR: YELLOW
GLUCOSE (HISTORICAL): NORMAL
HGB UR QL STRIP.AUTO: NORMAL
KETONES UR STRIP-MCNC: NORMAL MG/DL
LEUKOCYTE ESTERASE UR QL STRIP: NORMAL
NITRITE UR QL STRIP: NORMAL
PH UR STRIP.AUTO: 7 [PH]
PROT UR STRIP-MCNC: NORMAL MG/DL
SP GR UR STRIP.AUTO: 1.02
UROBILINOGEN UR QL STRIP.AUTO: NORMAL

## 2017-12-08 DIAGNOSIS — N50.89 OTHER SPECIFIED DISORDERS OF THE MALE GENITAL ORGANS: ICD-10-CM

## 2017-12-08 DIAGNOSIS — N18.2 CHRONIC KIDNEY DISEASE, STAGE II (MILD): ICD-10-CM

## 2017-12-12 ENCOUNTER — GENERIC CONVERSION - ENCOUNTER (OUTPATIENT)
Dept: OTHER | Facility: OTHER | Age: 57
End: 2017-12-12

## 2018-01-04 ENCOUNTER — ALLSCRIPTS OFFICE VISIT (OUTPATIENT)
Dept: OTHER | Facility: OTHER | Age: 58
End: 2018-01-04

## 2018-01-06 NOTE — PROGRESS NOTES
Assessment   1  Scrotal swelling (415 92) (N50 89)    Plan   Scrotal swelling    · US SCROTUM AND TESTICLES; Status:Hold For - Scheduling; Requested    [facility]:44YXE5395; Discussion/Summary      Patient is a 50-year-old male scrotal swelling - unclear as to the exact cause of patient's symptoms today  Reviewed his previous scrotal ultrasound  He is concerned today for possible hydrocele  He was educated on the different possible causes for this swelling  At this time, he may likely need a 2nd ultrasound for further evaluation  At this time, he was advised to use anti-inflammatory medications for symptom relief  He was also advised to wear loose her underwear to limit compression of his scrotum  If any symptoms should worsen, he was advised to call immediately  Chief Complaint   Patient presents with c/o fluid in his scrotum  He stated that this has been an ongoing problem for him  History of Present Illness   HPI: Patient is a 11year-old male presents today with CC swelling in his scrotum  He states that he has noticed persistently over the past few months  He states that occasionally he has had as initially her as did his symptoms as denies any urologic symptoms at this time  He has not been taking anything for his symptoms  Review of Systems        Constitutional: not feeling poorly-- and-- not feeling tired  ENT: no nosebleeds-- and-- no nasal discharge  Cardiovascular: no chest pain-- and-- no palpitations  Respiratory: no cough-- and-- no shortness of breath during exertion  Gastrointestinal: no nausea-- and-- no diarrhea  Genitourinary: no dysuria-- and-- no nocturia  Musculoskeletal: no arthralgias-- and-- no myalgias  Integumentary: no rashes-- and-- no skin lesions  Neurological: no headache,-- no numbness-- and-- no dizziness  Active Problems   1  ADPKD (autosomal dominant polycystic kidney) (705 13) (Q61 2)   2   Benign enlargement of prostate (600 00) (N40 0)   3  Benign essential hypertension (401 1) (I10)   4  Bilateral renal cysts (753 10) (N28 1)   5  Chronic kidney disease, stage 2 (mild) (585 2) (N18 2)   6  Chronic migraine without aura (346 70) (G43 709)   7  Contusion of rib on left side, initial encounter (922 1) (S20 212A)   8  Cystic kidney disease (753 10) (Q61 9)   9  Erectile dysfunction (607 84) (N52 9)   10  Generalized anxiety disorder (300 02) (F41 1)   11  Groin pain (789 09) (R10 30)   12  History of High risk sexual behavior (V69 2) (Z72 51)   13  History of nephrolithiasis (V13 01) (Z87 442)   14  Microhematuria (599 72) (R31 29)   15  Polycystic kidney disease (753 12) (Q61 3)   16  Rib injury    Past Medical History   1  History of Acute epididymitis (604 99) (N45 1)   2  History of Benign essential hypertension (401 1) (I10)   3  History of Colon cancer screening (V76 51) (Z12 11)   4  History of Fullness of scrotum (608 89) (N50 89)   5  History of folliculitis (F42 3) (H85 7)   6  History of hyperkalemia (V12 29) (Z86 39)   7  History of influenza vaccination (V49 89) (Z92 29)   8  History of migraine (V12 49) (Z86 69)   9  History of Laceration of right palm, initial encounter (882 0) (S61 411A)   10  History of Need for influenza vaccination (V04 81) (Z23)   11  History of Need for influenza vaccination (V04 81) (Z23)   12  History of Open Wound Of The Face (873 40)   13  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) (K13 70)   14  History of Postoperative wound dehiscence, initial encounter (998 32) (T81 31XA)   15  History of Preventative health care (V70 0) (Z00 00)   16  History of Proteinuria (791 0) (R80 9)   17  History of Screening for cholesterol level (V77 91) (Z13 220)   18  History of Screening for diabetes mellitus (V77 1) (Z13 1)   19  History of Screening for diabetes mellitus (DM) (V77 1) (Z13 1)   20  History of Screening for iron deficiency anemia (V78 0) (Z13 0)   21   History of Screening for thyroid disorder (V77 0) (Z13 29)   22  History of Screening for tuberculosis (V74 1) (Z11 1)   23  History of Skin infection (686 9) (L08 9)   24  History of Special screening examination for neoplasm of prostate (V76 44) (Z12 5)  Active Problems And Past Medical History Reviewed: The active problems and past medical history were reviewed and updated today  Family History   Father    1  Family history of ADPKD (autosomal dominant polycystic kidney disease)   2  Family history of hypertension (V17 49) (Z82 49)  Family History Reviewed: The family history was reviewed and updated today  Social History    · Being A Social Drinker   · 4=5 drinks/week, usually 3 shots   · History of Drug Use (305 90)   · Former smoker (X26 94) (C72 670)   · History of High risk sexual behavior (V69 2) (Z72 51)   · Never a smoker   · Occasional alcohol use  The social history was reviewed and updated today  The social history was reviewed and is unchanged  Surgical History   1  History of Appendectomy   2  History of Diagnostic Cystoscopy  Surgical History Reviewed: The surgical history was reviewed and updated today  Current Meds    1  BuPROPion HCl ER (XL) 300 MG Oral Tablet Extended Release 24 Hour; Take 1 tablet     daily; Therapy: 81Lfi5791 to (Last Rx:49Net0318)  Requested for: 88Nog0430 Ordered   2  HydrOXYzine HCl - 25 MG Oral Tablet; TAKE 1 TABLET 3 TIMES DAILY AS NEEDED; Therapy: 37RTI4047 to (22 720714)  Requested for: 35LBZ7102; Last     Rx:18Oct2017; Status: ACTIVE - Renewal Denied Ordered   3  Lidocaine 5 % External Ointment; APPLY 2 INCH Every twelve hours PRN pain; Therapy: 20Afi8846 to (Last Rx:05Tiq4311)  Requested for: 58Sok6849 Ordered   4  Olmesartan Medoxomil 20 MG Oral Tablet; TAKE 1 TABLET DAILY; Therapy: 85JIZ3964 to (Evaluate:10Wwc2611)  Requested for: 69GQP9467; Last     Rx:09Nov2017 Ordered   5  Sertraline HCl - 50 MG Oral Tablet;  Take 1 tablet daily; Therapy: 33MQL7350 to (XXYPPVLW:52PUA9785)  Requested for: 58KHW1962; Last     Rx:25Oct2017; Status: ACTIVE - Renewal Denied Ordered   6  Viagra 100 MG Oral Tablet; TAKE 1 TABLET BY MOUTH 1 HOUR PRIOR TO     INTERCOURSE; Therapy: 89QXK2038 to (Evaluate:13Jan2018); Last Rx:02Nov2017 Ordered     The medication list was reviewed and updated today  Allergies   1  No Known Drug Allergies    Vitals    Recorded: 37ZAW4859 05:24PM   Temperature 98 9 F, Tympanic   Heart Rate 92   Pulse Quality Normal   Systolic 060, LUE, Sitting   Diastolic 92, LUE, Sitting   BP CUFF SIZE Large   Height 5 ft 7 in   Weight 165 lb 9 oz   BMI Calculated 25 93   BSA Calculated 1 87   O2 Saturation 98, RA     Physical Exam        Constitutional      General appearance: No acute distress, well appearing and well nourished  Head and Face      Head and face: Normal        Eyes      Conjunctiva and lids: No erythema, swelling or discharge  Pulmonary      Respiratory effort: No increased work of breathing or signs of respiratory distress  Genitourinary      Scrotal contents: Abnormal   Scrotum findings: tenderness-- and-- bilateral varicocele  Testes: normal  Epididymis: normal  Spermatic cords: normal       Penis: Normal, no lesions  Musculoskeletal      Gait and station: Normal        Future Appointments      Date/Time Provider Specialty Site   11/26/2018 09:45 AM Tom Cortez MD Urology [facility]1 Annabelle WALDRON   01/18/2018 02:30 PM Ying Segura DO Nephrology [facility] 21     Signatures    Electronically signed by :  Damion Armstrong DO; Jan 5 2018  7:56AM EST                       (Author)

## 2018-01-08 ENCOUNTER — APPOINTMENT (OUTPATIENT)
Dept: LAB | Facility: MEDICAL CENTER | Age: 58
End: 2018-01-08
Payer: COMMERCIAL

## 2018-01-08 ENCOUNTER — TRANSCRIBE ORDERS (OUTPATIENT)
Dept: ADMINISTRATIVE | Facility: HOSPITAL | Age: 58
End: 2018-01-08

## 2018-01-08 DIAGNOSIS — N18.2 CHRONIC KIDNEY DISEASE, STAGE II (MILD): ICD-10-CM

## 2018-01-08 LAB
ANION GAP SERPL CALCULATED.3IONS-SCNC: 6 MMOL/L (ref 4–13)
BUN SERPL-MCNC: 14 MG/DL (ref 5–25)
CALCIUM SERPL-MCNC: 8.9 MG/DL (ref 8.3–10.1)
CHLORIDE SERPL-SCNC: 105 MMOL/L (ref 100–108)
CO2 SERPL-SCNC: 29 MMOL/L (ref 21–32)
CREAT SERPL-MCNC: 0.92 MG/DL (ref 0.6–1.3)
GFR SERPL CREATININE-BSD FRML MDRD: 93 ML/MIN/1.73SQ M
GLUCOSE P FAST SERPL-MCNC: 93 MG/DL (ref 65–99)
POTASSIUM SERPL-SCNC: 3.8 MMOL/L (ref 3.5–5.3)
SODIUM SERPL-SCNC: 140 MMOL/L (ref 136–145)

## 2018-01-08 PROCEDURE — 36415 COLL VENOUS BLD VENIPUNCTURE: CPT

## 2018-01-08 PROCEDURE — 80048 BASIC METABOLIC PNL TOTAL CA: CPT

## 2018-01-10 ENCOUNTER — HOSPITAL ENCOUNTER (OUTPATIENT)
Dept: ULTRASOUND IMAGING | Facility: MEDICAL CENTER | Age: 58
Discharge: HOME | End: 2018-01-10
Payer: COMMERCIAL

## 2018-01-10 DIAGNOSIS — N50.89 OTHER SPECIFIED DISORDERS OF THE MALE GENITAL ORGANS: ICD-10-CM

## 2018-01-10 PROCEDURE — 76870 US EXAM SCROTUM: CPT

## 2018-01-10 NOTE — RESULT NOTES
Verified Results  (1) HIV AG/AB Fredo, 4TH GEN [id number] 11:24AM Antionette Garcia Order Number: [id number]     Test Name Result Flag Reference   HIV 1/2 AND P24 Non-Reactive  Non-Reactive   This test detects HIV 1, HIV2 and p24 Antigen

## 2018-01-10 NOTE — MISCELLANEOUS
Message   Recorded as Task   Date: 05/11/2017 04:19 PM, Created By: Dona Eubanks   Task Name: Follow Up   Assigned To: Dona Eubanks   Regarding Patient: Henrique Hernandez, Status: In Progress   Mauro Hoang - 11 May 2017 4:19 PM     TASK CREATED  Aubrie Cadet called with BP readings:    5/6  am: 145/90  pm: 152/94    5/7  am: 145/82  pm: 154/87    5/8  am: 134/83  pm: 148/88   Faiza Bear - 11 May 2017 4:37 PM     TASK REPLIED TO: Previously Assigned To Faiza eBar    I could not reach the patient  I want to increase his olmesartan to 40mg daily with repeat BMP in 1 week to monitor potassium/Cr levels  Could you try reaching him? Ideally, I want his BP < 135/80 on average  MeenaCaitlin - 12 May 2017 11:44 AM     TASK IN PROGRESS   I called and spoke with Aubrie Cadet  Per Dr Silvestre Plan, he will now take olmesartan 40mg daily with a BMP in one week  I will mail him the lab slip  He will continue to check BPs daily and will call us in a week with BP readings  Active Problems   1  ADPKD (autosomal dominant polycystic kidney) (753 13) (Q61 2)  2  Benign essential hypertension (401 1) (I10)  3  Benign prostatic hypertrophy (600 00) (N40 0)  4  Chronic kidney disease, stage 2 (mild) (585 2) (N18 2)  5  Chronic migraine without aura (346 70) (G43 709)  6  Cystic kidney disease (753 10) (Q61 9)  7  Generalized anxiety disorder (300 02) (F41 1)  8  History of High risk sexual behavior (V69 2) (Z72 51)  9  History of nephrolithiasis (V13 01) (Z87 442)  10  Polycystic kidney disease (753 12) (Q61 3)    Current Meds  1  BuPROPion HCl ER (XL) 300 MG Oral Tablet Extended Release 24 Hour; Take 1 tablet   daily; Therapy: 76Eii3342 to (Last Rx:37Bld8568)  Requested for: 67Cst8162 Ordered  2  HydrOXYzine HCl - 25 MG Oral Tablet; TAKE 1 TABLET 3 TIMES DAILY AS NEEDED; Therapy: 28SLD5624 to (Evaluate:13Mar2017)  Requested for: 73OPA6277; Last   Rx:03Mar2017 Ordered  3   Olmesartan Medoxomil 40 MG Oral Tablet; TAKE 1 TABLET DAILY; Therapy: 20ILZ1805 to ([facility])  Requested for: 99PIL2394; Last   Rx:89Tfy4422 Ordered  4  Sertraline HCl - 50 MG Oral Tablet; Take 1 tablet daily; Therapy: 75ZQI1204 to (Last Rx:20Apr2017)  Requested for: 20Apr2017 Ordered  5  SUMAtriptan Succinate 50 MG Oral Tablet; TAKE 1 TABLET FOR MIGRAINE RELIEF    MAY REPEAT EVERY 2 HOURS  MAX 200MG/DAY; Therapy: 23WPV0134 to (Last Rx:79Sso6007)  Requested for: 19Knc1437 Ordered    Allergies   1   No Known Drug Allergies    Signatures   Electronically signed by : Shreya Jay DO; May 15 2017  8:34AM EST                       (Author)

## 2018-01-11 NOTE — RESULT NOTES
Message   Please call patient, informed him that all of his recent blood work appeared very stable  Please inform as well that his HIV test was negative  Thank you     Verified Results  (Q) CBC (INCLUDES DIFF/PLT) (REFL) [id number] 07:18AM C[facility] Frank     Test Name Result Flag Reference   WHITE BLOOD CELL COUNT 6 3 Thousand/uL  3 8-10 8   RED BLOOD CELL COUNT 4 84 Million/uL  4 20-5 80   HEMOGLOBIN 14 2 g/dL  13 2-17 1   HEMATOCRIT 42 2 %  38 5-50 0   MCV 87 1 fL  80 0-100 0   MCH 29 3 pg  27 0-33 0   MCHC 33 6 g/dL  32 0-36 0   RDW 13 3 %  11 0-15 0   PLATELET COUNT 101 Thousand/uL  140-400   MPV 8 5 fL  7 5-12 5   ABSOLUTE NEUTROPHILS 3566 cells/uL  8586-3322   ABSOLUTE LYMPHOCYTES 2249 cells/uL  850-3900   ABSOLUTE MONOCYTES 372 cells/uL  200-950   ABSOLUTE EOSINOPHILS 82 cells/uL     ABSOLUTE BASOPHILS 32 cells/uL  0-200   NEUTROPHILS 56 6 %     LYMPHOCYTES 35 7 %     MONOCYTES 5 9 %     EOSINOPHILS 1 3 %     BASOPHILS 0 5 %       (Q) COMPREHENSIVE METABOLIC PNL W/ADJUSTED CALCIUM [id number] 07:18AM [facility] Frank     Test Name Result Flag Reference   GLUCOSE 100 mg/dL H 65-99   Fasting reference interval   UREA NITROGEN (BUN) 18 mg/dL  7-25   CREATININE 1 07 mg/dL  0 70-1 33   For patients >52years of age, the reference limit  for Creatinine is approximately 13% higher for people  identified as -American  eGFR NON-AFR   AMERICAN 78 mL/min/1 73m2  > OR = 60   eGFR AFRICAN AMERICAN 90 mL/min/1 73m2  > OR = 60   BUN/CREATININE RATIO   2-63   NOT APPLICABLE (calc)   SODIUM 139 mmol/L  135-146   POTASSIUM 4 6 mmol/L  3 5-5 3   CHLORIDE 106 mmol/L     CARBON DIOXIDE 26 mmol/L  20-31   CALCIUM 9 1 mg/dL  8 6-10 3   CALCIUM (ADJUSTED FOR$ALBUMIN) 9 5 mg/dL (calc)  8 6-10 2   PROTEIN, TOTAL 6 3 g/dL  6 1-8 1   ALBUMIN 3 9 g/dL  3 6-5 1   GLOBULIN 2 4 g/dL (calc)  1 9-3 7   ALBUMIN/GLOBULIN RATIO 1 6 (calc)  1 0-2 5   BILIRUBIN, TOTAL 0 4 mg/dL  0 2-1 2   ALKALINE PHOSPHATASE 79 U/L   AST 15 U/L  10-35   ALT 15 U/L  9-46     (Q) HEMOGLOBIN A1c WITH eAG [id number] 07:18AM C[facility] Frank   REPORT COMMENT:  FASTING:YES     Test Name Result Flag Reference   HEMOGLOBIN A1c 5 9 % of total Hgb H <5 7   According to ADA guidelines, hemoglobin A1c <7 0%  represents optimal control in non-pregnant diabetic  patients  Different metrics may apply to specific  patient populations  Standards of Medical Care in    Diabetes Care  2013;36:s11-s66     For the purpose of screening for the presence of  diabetes  <5 7%       Consistent with the absence of diabetes  5 7-6 4%    Consistent with increased risk for diabetes              (prediabetes)  >or=6 5%    Consistent with diabetes     This assay result is consistent with an increased risk  of diabetes  Currently, no consensus exists for use of hemoglobin  A1c for diagnosis of diabetes for children  eAG (mg/dL) 123 (calc)     eAG (mmol/L) 6 8 (calc)       (Q) LIPID PANEL WITH REFLEX TO DIRECT LDL [id number] 07:18AM Cachorro Frank     Test Name Result Flag Reference   CHOLESTEROL, TOTAL 149 mg/dL  125-200   HDL CHOLESTEROL 54 mg/dL  > OR = 40   TRIGLICERIDES 38 mg/dL  <325   LDL-CHOLESTEROL 87 mg/dL (calc)  <130   Desirable range <100 mg/dL for patients with CHD or  diabetes and <70 mg/dL for diabetic patients with  known heart disease  CHOL/HDLC RATIO 2 8 (calc)  < OR = 5 0   NON HDL CHOLESTEROL 95 mg/dL (calc)     Target for non-HDL cholesterol is 30 mg/dL higher than   LDL cholesterol target  (Q) TSH, 3RD GENERATION W/REFLEX TO FT4 [id number] 07:18AM Cachorro Frank     Test Name Result Flag Reference   TSH W/REFLEX TO FT4 2 22 mIU/L  0 40-4 50     (1) HIV AG/AB COMBO, 4TH GEN [id number] 07:18AM Cachorro Frank     Test Name Result Flag Reference   HIV AG/AB, 4TH GEN NON-REACTIVE  NON-REACTIVE   HIV-1 antigen and HIV-1/HIV-2 antibodies were not  detected  There is no laboratory evidence of HIV  infection       PLEASE NOTE: This information has been disclosed to  you from records whose confidentiality may be  protected by state law  If your state requires such  protection, then the state law prohibits you from  making any further disclosure of the information  without the specific written consent of the person  to whom it pertains, or as otherwise permitted by law  A general authorization for the release of medical or  other information is NOT sufficient for this purpose  For additional information please refer to  [email or web address]  (This link is being provided for informational/  educational purposes only )        The performance of this assay has not been clinically  validated in patients less than 3years old

## 2018-01-11 NOTE — PSYCH
History of Present Illness  Psychotherapy Provided [place]: Individual Psychotherapy 25 minutes provided today  Goals addressed in session:   Met with pt individually for the initial session  Pt reports that he has been struggling with anxiety for the past several years and that it has increased in the past couple of months  Pt reports that he goes through periods of times that he considers a "funk" during which his anxiety is more significant and he has panic attacks  Pt reports over thinking, catastrophic thinking, and irritability  Pt also reports some OCD like behaviors  Pt states that he is on medication but that he wants to get a better handle on the anxiety aside from the medication  Discussed options for treatment and pt is in agreement with a referral to [facility] [facility] for ongoing OP therapy and will follow up with this worker as needed  HPI - Psych: Pt reports that he takes Wellbutrin, Sertraline, and Hydroxyzine as needed  Pt reports that the medication is helpful but that he does not like having to take it all the time  Pt reports that he has not been involved in any counseling for his anxiety but that it is something he is open to at this time  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  This worker will be making the referral and pt will follow up as needed in the future  Assessment    1   Generalized anxiety disorder (300 02) (F41 1)    Signatures   Electronically signed by : Sruthi Pinto LCSW; Mar 10 2017 12:55PM EST                       (Author)

## 2018-01-12 NOTE — RESULT NOTES
Verified Results  (1) COMPREHENSIVE METABOLIC PANEL 60TVJ2857 83:55MJ [facility] [employer] Order Number: [id number]     Test Name Result Flag Reference   SODIUM 134 mmol/L L 136-145   POTASSIUM 5 6 mmol/L H 3 5-5 3   CHLORIDE 101 mmol/L  100-108   CARBON DIOXIDE 28 mmol/L  21-32   ANION GAP (CALC) 5 mmol/L  4-13   BLOOD UREA NITROGEN 21 mg/dL  5-25   CREATININE 0 93 mg/dL  0 60-1 30   Standardized to IDMS reference method   CALCIUM 9 6 mg/dL  8 3-10 1   BILI, TOTAL 0 50 mg/dL  0 20-1 00   ALK PHOSPHATAS 110 U/L     ALT (SGPT) 21 U/L  12-78   Specimen collection should occur prior to Sulfasalazine and/or Sulfapyridine administration due to the potential for falsely depressed results  AST(SGOT) 17 U/L  5-45   Specimen collection should occur prior to Sulfasalazine administration due to the potential for falsely depressed results  ALBUMIN 4 1 g/dL  3 5-5 0   TOTAL PROTEIN 7 3 g/dL  6 4-8 2   eGFR 92 ml/min/1 73sq m     National Kidney Disease Education Program recommendations are as follows:  GFR calculation is accurate only with a steady state creatinine  Chronic Kidney disease less than 60 ml/min/1 73 sq  meters  Kidney failure less than 15 ml/min/1 73 sq  meters  GLUCOSE FASTING 102 mg/dL H 65-99   Specimen collection should occur prior to Sulfasalazine administration due to the potential for falsely depressed results  Specimen collection should occur prior to Sulfapyridine administration due to the potential for falsely elevated results  (1) HEMOGLOBIN A1C 98Uet5331 11:24AM [facility] [employer] Order Number: [id number]     Test Name Result Flag Reference   HEMOGLOBIN A1C 5 4 %  4 2-6 3   EST  AVG   GLUCOSE 108 mg/dl       (1) LIPID PANEL FASTING W DIRECT LDL REFLEX 47Giu2842 11:24AM [facility] Order Number: [id number]     Test Name Result Flag Reference   CHOLESTEROL 166 mg/dL     LDL CHOLESTEROL CALCULATED 85 mg/dL  0-100   Triglyceride:        Normal ??? ??? ??? ??? ??? ??? ??? <150 mg/dl   ??? ??? ???Borderline High ??? ??? 150-199 mg/dl   ??? ??? ? ?? High ??? ??? ??? ??? ??? ??? ??? 200-499 mg/dl   ??? ??? ? ??Very High ??? ??? ??? ??? ??? >499 mg/dl      Cholesterol:       Desirable ??? ??? ??? ??? <200 mg/dl   ??? ??? Borderline High ??? 200-239 mg/dl   ??? ??? High ??? ??? ??? ??? ??? ??? >239 mg/dl      HDL Cholesterol:       High ??? ???>59 mg/dL   ??? ??? Low ??? ??? <41 mg/dL      HDL Cholesterol:       High ??? ???>59 mg/dL   ??? ??? Low ??? ??? <41 mg/dL      This screening LDL is a calculated result  It does not have the accuracy of the Direct Measured LDL in the monitoring of patients with hyperlipidemia and/or statin therapy  Direct Measure LDL (CQW758) must be ordered separately in these patients  TRIGLYCERIDES 55 mg/dL  <=150   Specimen collection should occur prior to N-Acetylcysteine or Metamizole administration due to the potential for falsely depressed results  HDL,DIRECT 70 mg/dL H 40-60   Specimen collection should occur prior to Metamizole administration due to the potential for falsley depressed results  Plan  Acute epididymitis, ADPKD (autosomal dominant polycystic kidney), Benign essential  hypertension, Generalized anxiety disorder, Polycystic kidney disease, PMH: Screening  for diabetes mellitus, Screening for iron deficiency anemia    · Routine Venipuncture - POC; Status:Complete;   Done: 46ESE0840 11:27AM  Acute epididymitis, ADPKD (autosomal dominant polycystic kidney), Benign essential  hypertension, Generalized anxiety disorder, SocHx: High risk sexual behavior, Polycystic  kidney disease, Screening for diabetes mellitus (DM), Screening for iron  deficiency anemia    · (1) COMPREHENSIVE METABOLIC PANEL; Status:Complete;   Done: 83PDS9334  11:24AM   · (1) HEMOGLOBIN A1C; Status:Complete;   Done: 95GIX2603 11:24AM   · (1) HIV AG/AB COMBO, 4TH GEN; [Do Not Release]; Status: In Progress - Specimen/Data  Collected;   Done: 67JTU6494   · (1) LIPID PANEL FASTING W DIRECT LDL REFLEX; Status:Complete;   Done:  14JJJ0893 11:24AM  Acute epididymitis, Fullness of scrotum    · Urine Dip Automated- POC; Status:Complete;   Done: 12ZTN4592 12:11PM  Need for influenza vaccination    · Fluzone Quadrivalent Intramuscular Suspension

## 2018-01-12 NOTE — PSYCH
Behavioral Health Outpatient Intake    Referred By: Gibran July  Intake Questions: there are no developmental disabilities  the patient does not have a hearing impairment  the patient does not have an ICM or CTT  patient is not taking injectable psychiatric medications  Employment: The patient is employed full time at [employer]  Emergency Contact Information:   Emergency Contact: Jeremy Jeter   Relationship to Patient: MOTHER   Phone Number: [phone]   Previous Psychiatric Treatment: He has not been previously seen by a psychiatrist     He has not been previously seen by a therapist     History: no  service  He has not had combat service  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 as a member of the [employer] or [employer]  Insurance Subscriber: [employer]   Primary Insurance: [employer]     Presenting Problem (in patient's words): ANXIETY AND PANIC ATTACKS  Substance Abuse: NONE  Previous Treatment: The patient has not been seen here in the past      Accepted as Patient   Katherine Saunders 5/22/17 @ 11:00     Primary Care Physician: [facility]   Electronically signed by : Nilsa White, ; Mar 17 2017  9:13AM EST                       (Author)    Electronically signed by : Nilsa White, ; Mar 17 2017  9:16AM EST                       (Author)

## 2018-01-13 ENCOUNTER — GENERIC CONVERSION - ENCOUNTER (OUTPATIENT)
Dept: OTHER | Facility: OTHER | Age: 58
End: 2018-01-13

## 2018-01-13 VITALS
BODY MASS INDEX: 25.27 KG/M2 | DIASTOLIC BLOOD PRESSURE: 80 MMHG | SYSTOLIC BLOOD PRESSURE: 132 MMHG | HEIGHT: 67 IN | HEART RATE: 82 BPM | WEIGHT: 161 LBS

## 2018-01-13 VITALS
RESPIRATION RATE: 16 BRPM | OXYGEN SATURATION: 99 % | SYSTOLIC BLOOD PRESSURE: 140 MMHG | HEART RATE: 78 BPM | WEIGHT: 159.31 LBS | TEMPERATURE: 97.1 F | HEIGHT: 68 IN | DIASTOLIC BLOOD PRESSURE: 98 MMHG | BODY MASS INDEX: 24.14 KG/M2

## 2018-01-13 VITALS
SYSTOLIC BLOOD PRESSURE: 138 MMHG | BODY MASS INDEX: 25.45 KG/M2 | DIASTOLIC BLOOD PRESSURE: 82 MMHG | HEART RATE: 100 BPM | WEIGHT: 162.13 LBS | HEIGHT: 67 IN

## 2018-01-13 VITALS
RESPIRATION RATE: 16 BRPM | HEART RATE: 76 BPM | OXYGEN SATURATION: 99 % | DIASTOLIC BLOOD PRESSURE: 82 MMHG | WEIGHT: 164.19 LBS | TEMPERATURE: 97.2 F | BODY MASS INDEX: 28.03 KG/M2 | HEIGHT: 64 IN | SYSTOLIC BLOOD PRESSURE: 130 MMHG

## 2018-01-13 VITALS
BODY MASS INDEX: 25.98 KG/M2 | HEIGHT: 67 IN | DIASTOLIC BLOOD PRESSURE: 76 MMHG | WEIGHT: 165.5 LBS | SYSTOLIC BLOOD PRESSURE: 140 MMHG | HEART RATE: 88 BPM

## 2018-01-14 VITALS
OXYGEN SATURATION: 98 % | SYSTOLIC BLOOD PRESSURE: 130 MMHG | BODY MASS INDEX: 23.98 KG/M2 | RESPIRATION RATE: 17 BRPM | HEART RATE: 79 BPM | TEMPERATURE: 97.4 F | HEIGHT: 68 IN | DIASTOLIC BLOOD PRESSURE: 80 MMHG | WEIGHT: 158.25 LBS

## 2018-01-14 VITALS
BODY MASS INDEX: 24.85 KG/M2 | DIASTOLIC BLOOD PRESSURE: 82 MMHG | WEIGHT: 158.31 LBS | TEMPERATURE: 98.5 F | HEART RATE: 112 BPM | SYSTOLIC BLOOD PRESSURE: 120 MMHG | OXYGEN SATURATION: 99 % | RESPIRATION RATE: 16 BRPM | HEIGHT: 67 IN

## 2018-01-15 NOTE — RESULT NOTES
Message   Please call patient, informed that his recent ultrasound showed a mild increase in size of his kidney cysts  Please check with patient if he is seeing a nephrologist/urologist   Thank you     Verified Results  Joel Becerra [id number] 11:22AM Anna Hernandez Order Number: [id number]   Performing Comments: only for eval for cystic kidneys   - Patient Instructions: To schedule this appointment, please contact Central Scheduling at [phone]  Test Name Result Flag Reference   US KIDNEY AND BLADDER (Report)     RENAL ULTRASOUND     INDICATION: Follow-up renal cysts  COMPARISON: Ultrasound 8/8/2011     TECHNIQUE:  Ultrasound of the retroperitoneum was performed with a curvilinear transducer utilizing volumetric sweeps and still imaging techniques  FINDINGS:     KIDNEYS:   Symmetric and normal size  Right kidney: 11 0 x 5 2 cm  Normal echogenicity and contour  No solid masses detected  4 6 x 4 6 x 4 3 cm lower pole cyst has increased from the prior study, previously 2 6 x 3 0 x 2 8 cm  Possible incomplete septation  1 8 cm simple upper pole cyst  1 5 cm simple midpole cyst    No hydronephrosis  No shadowing calculi  No perinephric fluid collections  Left kidney: 11 5 x 6 7 cm  Normal echogenicity and contour  No solid masses detected  8 0 x 8 0 x 8 3 cm simple lower pole cyst, enlarged from prior study previously 4 3 x 3 8 x 4 2 center meter  Simple midpole cyst measuring up to 4 4 cm  4 9 x 5 9 x 4 8 cm upper pole cyst with thin septation  No hydronephrosis  No shadowing calculi  No perinephric fluid collections  URETERS:   Nonvisualized  BLADDER:    Normally distended  No focal thickening or mass lesions  Bilateral ureteral jets detected  Mild prostatomegaly  IMPRESSION:     Bilateral renal cysts have increased in size  Some demonstrate thin internal septations   Follow-up ultrasound in six-month recommended to ensure stability       ##fuslh6##fuslh6        Workstation performed: [id number]     Signed by:   Karly Castro DO   2/28/17

## 2018-01-17 ENCOUNTER — ALLSCRIPTS OFFICE VISIT (OUTPATIENT)
Dept: OTHER | Facility: OTHER | Age: 58
End: 2018-01-17

## 2018-01-18 ENCOUNTER — ALLSCRIPTS OFFICE VISIT (OUTPATIENT)
Dept: OTHER | Facility: OTHER | Age: 58
End: 2018-01-18

## 2018-01-18 NOTE — RESULT NOTES
Verified Results  (1) URINE CULTURE [id number] 07:11AM Radha Sharma Order Number: [id number]     Test Name Result Flag Reference   CLINICAL REPORT (Report)     Test:        Urine culture  Specimen Source:  Urine, Other  Specimen Type:   Urine  Specimen Date:   8/16/2017 7:11 AM  Result Date:    8/17/2017 9:38 AM  Result Status:   Final result  Resulting Lab:   [address] [address]            Tel: [phone]      CULTURE                                       ------------------                                   No Growth <1000 cfu/mL

## 2018-01-18 NOTE — PROGRESS NOTES
Assessment   1  Bilateral renal cysts (753 10) (N28 1)   2  Benign enlargement of prostate (600 00) (N40 0)   3  Tinea cruris (110 3) (B35 6)    Plan   Contusion of rib on left side, initial encounter    · Lidocaine 5 % External Ointment   Rx By: Zuleika Cardoso; Dispense: 0 Days ; #:1 X 50 GM Tube; Refill: 0;For: Contusion of rib on left side, initial encounter; DAVID = N; Sent To: [facility]/PHARMACY #[id number] Last Updated By: Eneida Beth; 1/17/2018 3:01:04 PM  Erectile dysfunction    · Viagra 100 MG Oral Tablet (Sildenafil Citrate)   Rx By: Nany Lawrence; Dispense: 6 Days ; #:6 Tablet; Refill: 11; For: Erectile dysfunction; DAVID = N; Print Rx; Last Updated By: Eneida Beth; 1/17/2018 3:01:04 PM  Generalized anxiety disorder    · Sertraline HCl - 50 MG Oral Tablet   Rx By: Yan Corea; Dispense: 90 Days ; #:90 TAB; Refill: 0;For: Generalized anxiety disorder; DAVID = N; Sent To: [email or web address] #[id number] Last Updated By: Eneida Beth; 1/17/2018 3:01:04 PM  Tinea cruris    · Nystatin 210445 UNIT/GM External Powder; APPLY TO AFFECTED AREA TWICE A    DAY FOR 7 TO 10 DAYS   Rx By: Nany Lawrence; Dispense: 7 Days ; #:1 X 30 GM Bottle; Refill: 2;For: Tinea cruris; DAVID = N; Verified Transmission to [facility]/PHARMACY #[id number] Last Updated By: System, [employer]; 1/17/2018 4:08:54 PM    Discussion/Summary   Discussion Summary:    My impression is adult polycystic kidney disease, microscopic hematuria, routine prostate cancer screening, left testicular discomfort, tinea cruris  prescribed nystatin power for his tinea cruris of the left groin  I provided the patient with reassurance that his physical examination is within normal limits and that there is no evidence of urologic pathology or hernia on examination  His next follow-up will be in the fall of 2018 with his next scheduled retroperitoneal ultrasound              Chief Complaint   Chief Complaint Free Text Note Form: Patient presents for Pain and small B/L hydroceles History of Present Illness   HPI: Briana Sharif is a 42-year-old male referred for microscopic hematuria  A recent urinalysis reveals 10-20 red blood cells per high-powered field  The patient has been evaluated by Nephrology  Recent renin and aldosterone levels are normal  A recent retroperitoneal ultrasound reveals a Bosniak 2 cyst identified in both kidneys  This is reported as âstableâ from a prior ultrasound in early 2017  In August 2017 he complained of a tender and swollen left testicle  He was diagnosed and treated with antibiotics for epididymitis  He also has a PSA of 3 2  He returns to the office today complaining again of left-sided testicular pain and discomfort  A recent scrotal ultrasound was performed and appears to be within normal limits upon my review  He requests repeat physical examination in the office today  Review of Systems   Complete-Male Urology:      Constitutional: No fever or chills, feels well, no tiredness, no recent weight gain or weight loss  Respiratory: No complaints of shortness of breath, no wheezing, no cough, no SOB on exertion, no orthopnea or PND  Cardiovascular: No complaints of slow heart rate, no fast heart rate, no chest pain, no palpitations, no leg claudication, no lower extremity  Gastrointestinal: No complaints of abdominal pain, no constipation, no nausea or vomiting, no diarrhea or bloody stools  Genitourinary: Empty sensation-- and-- stream quality fair, but-- no dysuria,-- no urinary hesitancy,-- no hematuria,-- no incontinence-- and-- no feelings of urinary urgency--       The patient presents with complaints of nocturia (Occasional )  Musculoskeletal: No complaints of arthralgia, no myalgias, no joint swelling or stiffness, no limb pain or swelling  Integumentary: No complaints of skin rash or skin lesions, no itching, no skin wound, no dry skin        Hematologic/Lymphatic: No complaints of swollen glands, no swollen glands in the neck, does not bleed easily, no easy bruising  Neurological: No compliants of headache, no confusion, no convulsions, no numbness or tingling, no dizziness or fainting, no limb weakness, no difficulty walking  Active Problems   1  ADPKD (autosomal dominant polycystic kidney) (753 13) (Q61 2)   2  Benign enlargement of prostate (600 00) (N40 0)   3  Benign essential hypertension (401 1) (I10)   4  Bilateral renal cysts (753 10) (N28 1)   5  Chronic kidney disease, stage 2 (mild) (585 2) (N18 2)   6  Chronic migraine without aura (346 70) (G43 709)   7  Contusion of rib on left side, initial encounter (922 1) (S20 212A)   8  Cystic kidney disease (753 10) (Q61 9)   9  Erectile dysfunction (607 84) (N52 9)   10  Generalized anxiety disorder (300 02) (F41 1)   11  Groin pain (789 09) (R10 30)   12  History of High risk sexual behavior (V69 2) (Z72 51)   13  History of nephrolithiasis (V13 01) (Z87 442)   14  Microhematuria (599 72) (R31 29)   15  Polycystic kidney disease (753 12) (Q61 3)   16  Rib injury   17  Scrotal swelling (608 86) (N50 89)    Past Medical History   1  History of Acute epididymitis (604 99) (N45 1)   2  History of Benign essential hypertension (401 1) (I10)   3  History of Colon cancer screening (V76 51) (Z12 11)   4  History of Fullness of scrotum (608 89) (N50 89)   5  History of folliculitis (P84 8) (Q30 0)   6  History of hyperkalemia (V12 29) (Z86 39)   7  History of influenza vaccination (V49 89) (Z92 29)   8  History of migraine (V12 49) (Z86 69)   9  History of Laceration of right palm, initial encounter (882 0) (S61 411A)   10  History of Need for influenza vaccination (V04 81) (Z23)   11  History of Need for influenza vaccination (V04 81) (Z23)   12  History of Open Wound Of The Face (873 40)   13  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) (K13 70)   14  History of Postoperative wound dehiscence, initial encounter (998 32) (T81 31XA)   15   History of Preventative health care (V70 0) (Z00 00)   16  History of Proteinuria (791 0) (R80 9)   17  History of Screening for cholesterol level (V77 91) (Z13 220)   18  History of Screening for diabetes mellitus (V77 1) (Z13 1)   19  History of Screening for diabetes mellitus (DM) (V77 1) (Z13 1)   20  History of Screening for iron deficiency anemia (V78 0) (Z13 0)   21  History of Screening for thyroid disorder (V77 0) (Z13 29)   22  History of Screening for tuberculosis (V74 1) (Z11 1)   23  History of Skin infection (686 9) (L08 9)   24  History of Special screening examination for neoplasm of prostate (V76 44) (Z12 5)    Surgical History   1  History of Appendectomy   2  History of Diagnostic Cystoscopy    Family History   Father    1  Family history of ADPKD (autosomal dominant polycystic kidney disease)   2  Family history of hypertension (V17 49) (Z82 49)    Social History    · Being A Social Drinker   · History of Drug Use (305 90)   · Former smoker (V15 82) (Z81 009)   · History of High risk sexual behavior (V69 2) (Z72 51)   · Never a smoker   · Occasional alcohol use    Current Meds    1  BuPROPion HCl ER (XL) 300 MG Oral Tablet Extended Release 24 Hour; Take 1 tablet     daily; Therapy: 35Ckr8646 to (Evaluate:27Pnc0542)  Requested for: 68POT0162; Last     Rx:12Jan2018 Ordered   2  HydrOXYzine HCl - 25 MG Oral Tablet; TAKE 1 TABLET 3 TIMES DAILY AS NEEDED; Therapy: 19GTD2969 to (21 )  Requested for: 38WPD5979; Last     Rx:18Oct2017; Status: ACTIVE - Renewal Denied Ordered   3  Lidocaine 5 % External Ointment; APPLY 2 INCH Every twelve hours PRN pain; Therapy: 18Eto1620 to (Last Rx:31Elz9606)  Requested for: 14Dqm9830 Ordered   4  Olmesartan Medoxomil 20 MG Oral Tablet; TAKE 1 TABLET DAILY; Therapy: 89IZC9139 to (Evaluate:90Iyx8452)  Requested for: 48UAG3010; Last     Rx:09Nov2017 Ordered   5  Sertraline HCl - 50 MG Oral Tablet; Take 1 tablet daily;      Therapy: 89QEN7547 to (Evaluate:74Mcl3377) Requested for: 29ZJB5594; Last     T23OOA7023; Status: ACTIVE - Renewal Denied Ordered   6  Viagra 100 MG Oral Tablet; TAKE 1 TABLET BY MOUTH 1 HOUR PRIOR TO     INTERCOURSE; Therapy: 63KJM4904 to (Evaluate:2018); Last Rx:2017 Ordered    Allergies   1  No Known Drug Allergies    Vitals   Vital Signs    Recorded: 40SEI7972 02:59PM   Heart Rate 84   Systolic 499   Diastolic 84   Height 5 ft 7 in   Weight 165 lb    BMI Calculated 25 84   BSA Calculated 1 86     Physical Exam        Additional Exam:  On examination he is in no acute distress  His abdomen is soft nontender nondistended   examination reveals normal phallus, scrotum and scrotal contents  A mild amount of tinea cruris is appreciated within the left groin crease  There is no sign of epididymitis  Perhaps a small left-sided spermatocele is appreciated        Future Appointments      Date/Time Provider Specialty Site   2018 09:45 AM Terri Ho MD Urology [address]   2018 02:30 PM Wilmar Pendleton DO Nephrology [address]     Signatures    Electronically signed by : Lew Higgins MD; 2018  4:11PM EST                       (Author)

## 2018-01-19 NOTE — PROGRESS NOTES
Assessment   1  ADPKD (autosomal dominant polycystic kidney) (753 13) (Q61 2)   2  Benign essential hypertension (401 1) (I10)   3  Chronic kidney disease, stage 2 (mild) (585 2) (N18 2)   4  History of nephrolithiasis (V13 01) (Z87 442)    Plan   Chronic kidney disease, stage 2 (mild)    · (1) BASIC METABOLIC PROFILE; Status:Active; Requested for:02Apr2018; Perform:[facility]; ODM:22VTE7543;REXPALF; For:Chronic kidney disease, stage 2 (mild); Ordered By:Faiza Bear;    Discussion/Summary      1  hyperkalemia - zan < 1 but repeat K stable at 3 8  Hyperkalemia resolved  Continue to monitor  bilateral renal cysts, some complex, likely d/t ADPKD(autosomal polycystic kidney disease) - increased in size from ultrasound in August 2011 have concern for underlying ADPKD as > 2 cysts in each kidney in age range 40-59 - this has 100% Positive predictive value and 95% negative predictive value for diagnosis of ADPKD  liver cysts on complete abdominal ultrasound also has family hx of father with ADPKD who is now on dialysis UA never performed  In office UA showed 2+blood and no protein  monitor sCr for progression 551mg/g as of May 2017  UpCr 0 08 as of last check - negligible  on ARB  avoid NSAID(ibuprofen, aleve, advil, motrin) of patients with ADPKD have intracranial aneurysms  Did have MRA brain in June 2012 which was negative for intracranial aneurysm  HTN - BP controlled at home  Check BP at home  Call office with BP readings  I wonder if you have a component of whitecoat HTN  Please bring home BP cuff to next doctor's visit  BP < 140/90 mmHg, ideally 125/75 mmHg  Check BP 2x/day for 3-4 days over the next week, record numbers and call office with readings  Do not check BP first thing in the morning, as BP in everyone runs higher than  alcohol intake-limit this to the weekends  Continue low salt diet  Continue to avoid NSAIDs/coffee    olmesartan 20mg daily (dose had been cut in half due to hyperkalemia) Hypertension is a major risk factor for progression of renal disease, especially in light of likely ADPKD  Agree with ARB usage  CKD stage I-II - sCr 0 92, eGFR per CKD EPI equation 98ml/min NSAIDs as above suspect your chronic kidney disease is due to your probable ADPKD   remote hx of nephrolithiasis - no recent stones occurrences  in 5-6 months  The patient was counseled regarding diagnostic results,-- instructions for management,-- prognosis  Patient is able to Self-Care  Possible side effects of new medications were reviewed with the patient/guardian today  The treatment plan was reviewed with the patient/guardian  The patient/guardian understands and agrees with the treatment plan    He has no barriers to learning  CKD Teaching includes hypertension management and Avoid nephrotoxic medication  Discussed with the patient      Reason For Visit   CKD2, HTN, ADPKD, proteinuria, hyperkalemia      History of Present Illness   64 yo M with hx of HTN, ADPKD, presents in follow up of CKD stage 2    he feels well  - BP at home 135/80s  At times, 150s/90s   kidney stone 30 years ago  None since then  recent hospitalizations/illnesses  No longer takes sertraline, lidocaine external cream, viagra, hydroxyzine  Review of Systems        Constitutional: no fever-- and-- no chills  Integumentary: no rashes  Gastrointestinal: no abdominal pain,-- no constipation,-- no nausea,-- no diarrhea-- and-- no vomiting  Respiratory: no shortness of breath-- and-- no cough  Cardiovascular: no chest pain-- and-- no lower extremity edema  Musculoskeletal: no joint pain-- and-- no joint swelling  Neurological: no headache,-- no lightheadedness-- and-- no dizziness  Genitourinary: no dysuria,-- no hematuria-- and-- no incomplete emptying of bladder  Eyes: no eyesight problems  ENT: hearing loss  Psychiatric: no anxiety-- and-- no depression         ROS reviewed  Past Medical History      The active problems and past medical history were reviewed and updated today  Surgical History      The surgical history was reviewed and updated today  Family History      The family history was reviewed and updated today  Social History   The social history was reviewed and updated today  The social history was reviewed and is unchanged  Current Meds    1  BuPROPion HCl ER (XL) 300 MG Oral Tablet Extended Release 24 Hour; Take 1 tablet     daily; Therapy: 66Tdr7374 to (Evaluate:50Zla7658)  Requested for: 61DPW2303; Last     Rx:12Jan2018 Ordered   2  Nystatin 600079 UNIT/GM External Powder; APPLY TO AFFECTED AREA TWICE A DAY     FOR 7 TO 10 DAYS; Therapy: 33IEL2916 to (Evaluate:94Kit5157)  Requested for: 48AQQ2062; Last     Rx:17Jan2018 Ordered   3  Olmesartan Medoxomil 20 MG Oral Tablet; TAKE 1 TABLET DAILY; Therapy: 36NTY5287 to (468 6520)  Requested for: 34QDA3047; Last     Rx:09Nov2017 Ordered     The medication list was reviewed and updated today  Allergies   1  No Known Drug Allergies    Vitals   Vital Signs    Recorded: 45STE2229 77:17OT   Systolic 003   Diastolic 92   Height 5 ft 7 in   Weight 165 lb    BMI Calculated 25 84   BSA Calculated 1 86     Physical Exam        Constitutional: General appearance: No acute distress, well appearing and well nourished  ENT: External ears and nose appear normal          Eyes: Anicteric sclerae  Pulmonary: Respiratory effort: No increased work of breathing or signs of respiratory distress  -- Auscultation of lungs: Clear to auscultation  Cardiovascular: Auscultation of heart: Normal rate and rhythm, normal S1 and S2, without murmurs  Abdomen: Non-tender, no masses  Extremities: No cyanosis, clubbing or edema  Rash: No rash present        Neurologic: Non Focal          Psychiatric: Orientation to person, place, and time: Normal  -- and-- Mood and affect: Normal        Results/Data   US SCROTUM AND TESTICLES 34WRG5473 12:28PM Maxi Lopez Order Number: [id number]       - Patient Instructions: To schedule this appointment, please contact Central Scheduling at [phone]  Test Name Result Flag Reference   US SCROTUM AND TESTICLES (Report)     SCROTAL ULTRASOUND           INDICATION: N50 89: Other specified disorders of the male genital organs  History taken directly from the electronic ordering system  History of hydrocele, follow-up  COMPARISON: 9/18/2017           TECHNIQUE:  Ultrasound the scrotal contents was performed with a high frequency linear transducer utilizing volumetric sweep imaging as well as standard still image techniques  Imaging performed in longitudinal and transverse orientation  Color and       spectral Doppler evaluation also performed bilaterally  FINDINGS:           TESTES:       Testes are symmetric and normal in size  RIGHT testis = 4 3 x 2 8 x 3 6 cm       Normal contour with homogeneous smooth echotexture  No intratesticular mass lesion or calcifications  LEFT testis = 4 1 x 2 6 x 3 1 cm      Normal contour with homogeneous smooth echotexture  No intratesticular mass lesion or calcifications  Doppler flow within both testes is present and symmetric  EPIDIDYMIDES:       Normal Size  Doppler ultrasound demonstrates normal blood flow  There is a tiny right epididymal head cyst or spermatocele measuring 3 mm  Stable tiny left epididymal head cyst or spermatocele measuring 5 mm  HYDROCELE: Small bilateral hydroceles  VARICOCELE: None present  SCROTUM: Scrotal thickness and appearance within normal limits  No evidence for extratesticular mass or hernia demonstrated  Stable tiny left scrotal jane  IMPRESSION:            Small bilateral hydroceles             Tiny bilateral epididymal head cysts or spermatoceles  Tiny left scrotal jane again noted  Normal testes  Workstation performed: [id number]           Signed by:      Elida Barbosa MD      1/10/18      (1) Scooter [id number] 09:46AM Devonte GUERRA Order Number: [id number]      Test Name Result Flag Reference   SODIUM 140 mmol/L  136-145   POTASSIUM 3 8 mmol/L  3 5-5 3   CHLORIDE 105 mmol/L  100-108   CARBON DIOXIDE 29 mmol/L  21-32   ANION GAP (CALC) 6 mmol/L  4-13   BLOOD UREA NITROGEN 14 mg/dL  5-25   CREATININE 0 92 mg/dL  0 60-1 30   Standardized to IDMS reference method   CALCIUM 8 9 mg/dL  8 3-10 1   eGFR 93 ml/min/1 73sq m     National Kidney Disease Education Program recommendations are as follows:     GFR calculation is accurate only with a steady state creatinine     Chronic Kidney disease less than 60 ml/min/1 73 sq  meters     Kidney failure less than 15 ml/min/1 73 sq  meters  GLUCOSE FASTING 93 mg/dL  65-99   Specimen collection should occur prior to Sulfasalazine administration due to the potential for falsely depressed results  Specimen collection should occur prior to Sulfapyridine administration due to the potential for falsely elevated results          Future Appointments      Date/Time Provider Specialty Site   11/26/2018 09:45 AM Mary Guevara MD Urology [address]     Signatures    Electronically signed by : Manav Greer DO; Jan 18 2018  3:00PM EST                       (Author)

## 2018-01-22 VITALS
SYSTOLIC BLOOD PRESSURE: 140 MMHG | BODY MASS INDEX: 25.19 KG/M2 | DIASTOLIC BLOOD PRESSURE: 98 MMHG | WEIGHT: 160.5 LBS | HEIGHT: 67 IN

## 2018-01-22 VITALS — SYSTOLIC BLOOD PRESSURE: 138 MMHG | DIASTOLIC BLOOD PRESSURE: 98 MMHG

## 2018-01-23 VITALS
OXYGEN SATURATION: 98 % | HEART RATE: 92 BPM | TEMPERATURE: 98.9 F | WEIGHT: 165.56 LBS | SYSTOLIC BLOOD PRESSURE: 150 MMHG | BODY MASS INDEX: 25.99 KG/M2 | DIASTOLIC BLOOD PRESSURE: 92 MMHG | HEIGHT: 67 IN

## 2018-01-23 VITALS
HEIGHT: 67 IN | BODY MASS INDEX: 25.9 KG/M2 | HEART RATE: 84 BPM | DIASTOLIC BLOOD PRESSURE: 84 MMHG | SYSTOLIC BLOOD PRESSURE: 132 MMHG | WEIGHT: 165 LBS

## 2018-01-23 VITALS
HEIGHT: 67 IN | WEIGHT: 165 LBS | SYSTOLIC BLOOD PRESSURE: 160 MMHG | DIASTOLIC BLOOD PRESSURE: 92 MMHG | BODY MASS INDEX: 25.9 KG/M2

## 2018-01-23 NOTE — MISCELLANEOUS
Message   Recorded as Task   Date: 12/08/2017 02:10 PM, Created By: Cristin Fowler   Task Name: Care Coordination   Assigned To: Ed Workman   Regarding Patient: Nayeli Simpson, Status: In Progress   Comment:    Cristin Fowler - 08 Dec 2017 2:10 PM     TASK CREATED  Can you make sure this patient has follow up scheduled with me? I have ordered repeat BMP as prior potassium high  Carine Vance - 08 Dec 2017 2:42 PM     TASK IN PROGRESS   Patient is scheduled 1/18 with kieran  Patient is also aware he should repeat a BMP before his appointment  CR      Active Problems    1  Acute epididymitis (604 99) (N45 1)   2  ADPKD (autosomal dominant polycystic kidney) (753 13) (Q61 2)   3  Benign enlargement of prostate (600 00) (N40 0)   4  Benign essential hypertension (401 1) (I10)   5  Bilateral renal cysts (753 10) (N28 1)   6  Chronic kidney disease, stage 2 (mild) (585 2) (N18 2)   7  Chronic migraine without aura (346 70) (G43 709)   8  Colon cancer screening (V76 51) (Z12 11)   9  Contusion of rib on left side, initial encounter (922 1) (S20 212A)   10  Cystic kidney disease (753 10) (Q61 9)   11  Erectile dysfunction (607 84) (N52 9)   12  Fullness of scrotum (608 89) (N50 89)   13  Generalized anxiety disorder (300 02) (F41 1)   14  Groin pain (789 09) (R10 30)   15  History of High risk sexual behavior (V69 2) (Z72 51)   16  History of nephrolithiasis (V13 01) (Z87 442)   17  Laceration of right palm, initial encounter (882 0) (S61 411A)   18  Microhematuria (599 72) (R31 29)   19  Need for influenza vaccination (V04 81) (Z23)   20  Polycystic kidney disease (753 12) (Q61 3)   21  Postoperative wound dehiscence, initial encounter (998 32) (T81 31XA)   22  Proteinuria (791 0) (R80 9)   23  Rib injury   24  Screening for diabetes mellitus (DM) (V77 1) (Z13 1)   25  Screening for iron deficiency anemia (V78 0) (Z13 0)   26   Skin infection (686 9) (L08 9)   27  Special screening examination for neoplasm of prostate (V76 44) (Z12 5)    Current Meds   1  BuPROPion HCl ER (XL) 300 MG Oral Tablet Extended Release 24 Hour; Take 1 tablet   daily; Therapy: 46Woe4280 to (Last Rx:10Apr2017)  Requested for: 10Apr2017 Ordered   2  HydrOXYzine HCl - 25 MG Oral Tablet; TAKE 1 TABLET 3 TIMES DAILY AS NEEDED; Therapy: 51YWB2201 to (John Stewart)  Requested for: 32XTN3315; Last   Rx:18Oct2017; Status: ACTIVE - Renewal Denied Ordered   3  Lidocaine 5 % External Ointment; APPLY 2 INCH Every twelve hours PRN pain; Therapy: 52Xnq8678 to (Last Rx:70Pps7118)  Requested for: 30Izy3657 Ordered   4  Olmesartan Medoxomil 20 MG Oral Tablet; TAKE 1 TABLET DAILY; Therapy: 06TJF0074 to (Evaluate:31Pfl1000)  Requested for: 40ZZW5383; Last   Rx:09Nov2017 Ordered   5  Sertraline HCl - 50 MG Oral Tablet; Take 1 tablet daily; Therapy: 37ZJI7783 to (PYZDXCVH:04KGD8920)  Requested for: 06KLC8837; Last   Rx:25Oct2017; Status: ACTIVE - Renewal Denied Ordered   6  Viagra 100 MG Oral Tablet; TAKE 1 TABLET BY MOUTH 1 HOUR PRIOR TO   INTERCOURSE; Therapy: 34RLD2073 to (Evaluate:13Jan2018); Last Rx:02Nov2017 Ordered    Allergies    1   No Known Drug Allergies    Signatures   Electronically signed by : Con Akers DO; Dec 12 2017 12:26PM EST                       (Author)

## 2018-02-05 DIAGNOSIS — F32.A DEPRESSION, UNSPECIFIED DEPRESSION TYPE: Primary | ICD-10-CM

## 2018-02-06 DIAGNOSIS — F32.A DEPRESSION, UNSPECIFIED DEPRESSION TYPE: ICD-10-CM

## 2018-04-02 DIAGNOSIS — N18.2 CHRONIC KIDNEY DISEASE, STAGE II (MILD): ICD-10-CM

## 2018-11-20 DIAGNOSIS — R31.29 OTHER MICROSCOPIC HEMATURIA: ICD-10-CM

## 2018-11-20 DIAGNOSIS — Z12.5 ENCOUNTER FOR SCREENING FOR MALIGNANT NEOPLASM OF PROSTATE: ICD-10-CM
